# Patient Record
Sex: MALE | Race: WHITE | NOT HISPANIC OR LATINO | URBAN - METROPOLITAN AREA
[De-identification: names, ages, dates, MRNs, and addresses within clinical notes are randomized per-mention and may not be internally consistent; named-entity substitution may affect disease eponyms.]

---

## 2017-04-05 ENCOUNTER — ALLSCRIPTS OFFICE VISIT (OUTPATIENT)
Dept: OTHER | Facility: OTHER | Age: 52
End: 2017-04-05

## 2017-04-07 LAB — AMBIG ABBREV CMP14 DEFAULT (HISTORICAL): NORMAL

## 2017-04-08 LAB
BASOPHILS # BLD AUTO: 0.1 X10E3/UL (ref 0–0.2)
BASOPHILS # BLD AUTO: 1 %
CHOLEST SERPL-MCNC: 175 MG/DL (ref 100–199)
DEPRECATED RDW RBC AUTO: 14.1 % (ref 12.3–15.4)
EOSINOPHIL # BLD AUTO: 0.5 X10E3/UL (ref 0–0.4)
EOSINOPHIL # BLD AUTO: 6 %
HCT VFR BLD AUTO: 41.6 % (ref 37.5–51)
HDLC SERPL-MCNC: 46 MG/DL
HGB BLD-MCNC: 13.6 G/DL (ref 12.6–17.7)
IMM.GRANULOCYTES (CD4/8) (HISTORICAL): 0 %
IMM.GRANULOCYTES (CD4/8) (HISTORICAL): 0 X10E3/UL (ref 0–0.1)
LDLC SERPL CALC-MCNC: 108 MG/DL (ref 0–99)
LYMPHOCYTES # BLD AUTO: 3.2 X10E3/UL (ref 0.7–3.1)
LYMPHOCYTES # BLD AUTO: 37 %
MCH RBC QN AUTO: 30.3 PG (ref 26.6–33)
MCHC RBC AUTO-ENTMCNC: 32.7 G/DL (ref 31.5–35.7)
MCV RBC AUTO: 93 FL (ref 79–97)
MONOCYTES # BLD AUTO: 0.8 X10E3/UL (ref 0.1–0.9)
MONOCYTES (HISTORICAL): 9 %
NEUTROPHILS # BLD AUTO: 4 X10E3/UL (ref 1.4–7)
NEUTROPHILS # BLD AUTO: 47 %
PLATELET # BLD AUTO: 227 X10E3/UL (ref 150–379)
PROSTATE SPECIFIC ANTIGEN (HISTORICAL): 0.3 NG/ML (ref 0–4)
RBC (HISTORICAL): 4.49 X10E6/UL (ref 4.14–5.8)
TRIGL SERPL-MCNC: 105 MG/DL (ref 0–149)
TSH SERPL DL<=0.05 MIU/L-ACNC: 1.59 UIU/ML (ref 0.45–4.5)
WBC # BLD AUTO: 8.5 X10E3/UL (ref 3.4–10.8)

## 2017-04-11 ENCOUNTER — GENERIC CONVERSION - ENCOUNTER (OUTPATIENT)
Dept: OTHER | Facility: OTHER | Age: 52
End: 2017-04-11

## 2017-04-12 LAB
TESTOSTERONE FREE (HISTORICAL): 4.7 PG/ML (ref 7.2–24)
TESTOSTERONE TOTAL (HISTORICAL): 887.4 NG/DL (ref 348–1197)

## 2017-04-13 ENCOUNTER — GENERIC CONVERSION - ENCOUNTER (OUTPATIENT)
Dept: OTHER | Facility: OTHER | Age: 52
End: 2017-04-13

## 2017-05-11 ENCOUNTER — GENERIC CONVERSION - ENCOUNTER (OUTPATIENT)
Dept: OTHER | Facility: OTHER | Age: 52
End: 2017-05-11

## 2017-06-26 ENCOUNTER — GENERIC CONVERSION - ENCOUNTER (OUTPATIENT)
Dept: OTHER | Facility: OTHER | Age: 52
End: 2017-06-26

## 2017-06-27 ENCOUNTER — ALLSCRIPTS OFFICE VISIT (OUTPATIENT)
Dept: OTHER | Facility: OTHER | Age: 52
End: 2017-06-27

## 2017-12-08 ENCOUNTER — ALLSCRIPTS OFFICE VISIT (OUTPATIENT)
Dept: OTHER | Facility: OTHER | Age: 52
End: 2017-12-08

## 2017-12-09 NOTE — PROGRESS NOTES
Assessment    1  Encounter for preventive health examination (V70 0) (Z00 00)   2  Attention-deficit/hyperactivity disorder (314 01) (F90 9)    Plan  Attention-deficit/hyperactivity disorder    · Amphetamine-Dextroamphet ER 30 MG Oral Capsule Extended Release 24Hour; TAKE 2 CAPSULES DAILY; Do Not Fill Before: [id number]  Gastritis    · Omeprazole 40 MG Oral Capsule Delayed Release; TAKE 1 CAPSULE Daily    Chief Complaint  MED CHECKbone spur--left shoulder      History of Present Illness  ADD stable      Review of Systems   Cardiovascular: No complaints of slow heart rate, no fast heart rate, no chest pain, no palpitations, no leg claudication, no lower extremity  Respiratory: No complaints of shortness of breath, no wheezing, no cough, no SOB on exertion, no orthopnea or PND  Active Problems  1  Allergic contact dermatitis (692 9) (L23 9)   2  Attention-deficit/hyperactivity disorder (314 01) (F90 9)   3  Bakers cyst (727 51) (M71 20)   4  Chronic lumbar pain (724 2,338 29) (M54 5,G89 29)   5  Encounter for prostate cancer screening (V76 44) (Z12 5)   6  Encounter for screening colonoscopy (V76 51) (Z12 11)   7  Family history of high cholesterol (V18 19) (Z83 42)   8  Fatigue (780 79) (R53 83)   9  Gastritis (535 50) (K29 70)   10  Lateral epicondylitis of left elbow (726 32) (M77 12)   11  Localized enlarged lymph nodes (785 6) (R59 0)   12  Pre-ulcerative corn or callous (700) (L84)   13  Shoulder bursitis, left (726 10) (M75 52)    Past Medical History  1  History of dyschromia (V13 3) (Z87 2)   2  History of spinal stenosis (V13 59) (Z87 39)   3  History of Idiopathic peripheral neuropathy (356 9) (G60 9)   4  History of Myalgia and myositis (729 1)    Family History  Mother    1  Denied: Family history of substance abuse   2  Denied: FH: mental illness  Father    3  Family history of diabetes mellitus (V18 0) (Z83 3)   4  Denied: Family history of substance abuse   5   Denied: FH: mental illness    Social History     · Current every day smoker (305 1) (F17 200)   · Smokes < 1 pack of cigarettes per day (305 1) (F17 210)    Current Meds   1  Amphetamine-Dextroamphet ER 30 MG Oral Capsule Extended Release 24 Hour; TAKE 2 CAPSULES DAILY; Therapy: 01FKS0745 to (Evaluate:25Nov2017); Last Rx:26Oct2017; Status: ACTIVE - Retrospective By Protocol Authorization Ordered   2  Duexis 800-26 6 MG Oral Tablet; take one table tid; Therapy: 63RHM2117 to (Evaluate:29Mar2017)  Requested for: 95ZKJ8167; Last Rx:29Nov2016; Status: ACTIVE - Retrospective By Protocol Authorization Ordered   3  OxyCODONE HCl - 15 MG Oral Tablet; 1 q 8hrs  prn; Therapy: 37DOW7807 to (Last Rx:05Jan2017); Status: ACTIVE - Retrospective By Protocol Authorization Ordered   4  PredniSONE 20 MG Oral Tablet; 1 bid x 5 days , 1 qd x 5 days; Therapy: 56HBZ5828 to (Evaluate:08Jan2017)  Requested for: 29DZK5961; Last Rx:29Nov2016 Ordered   5  Triamcinolone Acetonide 0 5 % External Cream; APPLY 2-3 TIMES DAILY TO AFFECTED AREA(S); Therapy: 65JXG8769 to (Last Rx:27Jun2017)  Requested for: 01TRL2301 Ordered    Allergies  1  No Known Drug Allergies    Vitals  Vital Signs    Recorded: 11RCK8282 09:16AM   Heart Rate 90   Systolic 274   Diastolic 76   Height 6 ft 2 in   Weight 180 lb    BMI Calculated 23 11   BSA Calculated 2 08   O2 Saturation 98       Physical Exam   Constitutional  General appearance: No acute distress, well appearing and well nourished  Pulmonary  Auscultation of lungs: Clear to auscultation, equal breath sounds bilaterally, no wheezes, no rales, no rhonci  Results/Data  PHQ-2 Adult Depression Screening 02QRM2685 09:19AM User, Ahs     Test Name Result Flag Reference   PHQ-2 Adult Depression Score 0       Over the last two weeks, how often have you been bothered by any of the following problems?  Little interest or pleasure in doing things: Not at all - 0 Feeling down, depressed, or hopeless: Not at all - 0   PHQ-2 Adult Depression Screening Negative           Health Management  Encounter for screening colonoscopy   COLONOSCOPY; every 10 years; Last 77ANZ3950; Next Due: 72ZHK2437;  Active    Signatures   Electronically signed by : Maryanne Graves MD; Dec  8 2017  9:49AM EST                       (Author)

## 2018-01-09 NOTE — RESULT NOTES
Verified Results  [facility]) CBC+Platelet+Hem Review 48QYD9907 12:22PM Juaquin Galvin     Test Name Result Flag Reference   WBC 7 0 x10E3/uL  3 4-10 8   RBC 4 35 x10E6/uL  4 14-5 80   Hemoglobin 13 4 g/dL  12 6-17 7   Hematocrit 39 9 %  37 5-51 0   MCV 92 fL  79-97   MCH 30 8 pg  26 6-33 0   MCHC 33 6 g/dL  31 5-35 7   RDW 13 5 %  12 3-15 4   Platelets 915 J08O1/LL  150-379   Neutrophils 51 %     Lymphs 47 %     Monocytes 1 %     Eos 0 %     Basos 1 %     Neutrophils Absolute 3 6 X10E3/uL  1 4-7 0   Lymphs (Absolute) 3 3 X10E3/uL H 0 7-3 1   Monocytes(Absolute) 0 1 X10E3/uL  0 1-0 9   Eos (Absolute Value) 0 0 X10E3/uL  0 0-0 4   Baso(Absolute) 0 1 X10E3/uL  0 0-0 2   RBC Comment RBC's appear normal   Normal   Platelet Comment Adequate  Adequate   WBC 7 0 x10E3/uL  3 4-10 8   RBC 4 35 x10E6/uL  4 14-5 80   Hemoglobin 13 4 g/dL  12 6-17 7   Hematocrit 39 9 %  37 5-51 0   MCV 92 fL  79-97   MCH 30 8 pg  26 6-33 0   MCHC 33 6 g/dL  31 5-35 7   RDW 13 5 %  12 3-15 4   Platelets 309 T51K9/HX  150-379   Neutrophils 51 %     Lymphs 47 %     Monocytes 1 %     Eos 0 %     Basos 1 %     Neutrophils Absolute 3 6 X10E3/uL  1 4-7 0   Lymphs (Absolute) 3 3 X10E3/uL H 0 7-3 1   Monocytes(Absolute) 0 1 X10E3/uL  0 1-0 9   Eos (Absolute Value) 0 0 X10E3/uL  0 0-0 4   Baso(Absolute) 0 1 X10E3/uL  0 0-0 2   RBC Comment RBC's appear normal   Normal   Platelet Comment Adequate  Adequate

## 2018-01-09 NOTE — RESULT NOTES
Verified Results  (1) TSH 07Apr2017 06:44AM Coral Arline     Test Name Result Flag Reference   TSH 1 590 uIU/mL  0 450-4 500                 [facility]) Godwin Daniels XEY11 Default 07Apr2017 06:44AM Coral Arline     Test Name Result Flag Reference   Godwin Daniels CMP14 Default Comment     A hand-written panel/profile was received from your office  In  accordance with the [facility] Ambiguous Test Code Policy dated July 9637, we have completed your order by using the closest currently  or formerly recognized AMA panel  We have assigned Comprehensive  Metabolic Panel (14), Test Code #087201 to this request   If this  is not the testing you wished to receive on this specimen, please  contact the [address] [place] [address] Client Inquiry/Technical Services Department  to clarify the test order  We appreciate your business  (1) LIPID PANEL FASTING W DIRECT LDL REFLEX 07Apr2017 06:44AM Janet Trivedi     Test Name Result Flag Reference   Cholesterol, Total 175 mg/dL  100-199   Triglycerides 105 mg/dL  0-149   HDL Cholesterol 46 mg/dL  >39   LDL Cholesterol Calc 108 mg/dL H 0-99   Cholesterol, Total 175 mg/dL  100-199   Triglycerides 105 mg/dL  0-149   HDL Cholesterol 46 mg/dL  >39   LDL Cholesterol Calc 108 mg/dL H 0-99           (1) PSA (SCREEN) (Dx V76 44 Screen for Prostate Cancer) 07Apr2017 06:44AM Coral Farooq     Test Name Result Flag Reference   Prostate Specific Ag, Serum 0 3 ng/mL  0 0-4 0   Roche ECLIA methodology  According to the American Urological Association, Serum PSA should  decrease and remain at undetectable levels after radical  prostatectomy  The AUA defines biochemical recurrence as an initial  PSA value 0 2 ng/mL or greater followed by a subsequent confirmatory  PSA value 0 2 ng/mL or greater  Values obtained with different assay methods or kits cannot be used  interchangeably  Results cannot be interpreted as absolute evidence  of the presence or absence of malignant disease  (1) CBC/PLT/DIFF 07Apr2017 06:44AM Moi Nolen     Test Name Result Flag Reference   WBC 8 5 x10E3/uL  3 4-10 8   RBC 4 49 x10E6/uL  4 14-5 80   Hemoglobin 13 6 g/dL  12 6-17 7   Hematocrit 41 6 %  37 5-51 0   MCV 93 fL  79-97   MCH 30 3 pg  26 6-33 0   MCHC 32 7 g/dL  31 5-35 7   RDW 14 1 %  12 3-15 4   Platelets 514 J90Y1/QQ  150-379   Neutrophils 47 %     Lymphs 37 %     Monocytes 9 %     Eos 6 %     Basos 1 %     Neutrophils (Absolute) 4 0 x10E3/uL  1 4-7 0   Lymphs (Absolute) 3 2 x10E3/uL H 0 7-3 1   Monocytes(Absolute) 0 8 x10E3/uL  0 1-0 9   Eos (Absolute) 0 5 x10E3/uL H 0 0-0 4   Baso (Absolute) 0 1 x10E3/uL  0 0-0 2   Immature Granulocytes 0 %     Immature Grans (Abs) 0 0 x10E3/uL  0 0-0 1   WBC 8 5 x10E3/uL  3 4-10 8   RBC 4 49 x10E6/uL  4 14-5 80   Hemoglobin 13 6 g/dL  12 6-17 7   Hematocrit 41 6 %  37 5-51 0   MCV 93 fL  79-97   MCH 30 3 pg  26 6-33 0   MCHC 32 7 g/dL  31 5-35 7   RDW 14 1 %  12 3-15 4   Platelets 711 O27Z7/YD  150-379   Neutrophils 47 %     Lymphs 37 %     Monocytes 9 %     Eos 6 %     Basos 1 %     Neutrophils (Absolute) 4 0 x10E3/uL  1 4-7 0   Lymphs (Absolute) 3 2 x10E3/uL H 0 7-3 1   Monocytes(Absolute) 0 8 x10E3/uL  0 1-0 9   Eos (Absolute) 0 5 x10E3/uL H 0 0-0 4   Baso (Absolute) 0 1 x10E3/uL  0 0-0 2   Immature Granulocytes 0 %     Immature Grans (Abs) 0 0 x10E3/uL  0 0-0 1

## 2018-01-10 NOTE — RESULT NOTES
Message   Recorded as Task   Date: 04/11/2017 08:47 AM, Created By: Meg Gomes   Task Name: Call Patient with results   Assigned To:  Meg Gomes   Regarding Patient: Pa Sánchez, Status: Active   CommentDarlene Alias - 11 Apr 2017 8:47 AM     Patient Phone: [phone]    all NL--mm/labs 12mos   Melissa Rivera - 11 Apr 2017 9:45 AM     TASK EDITED  PT AWARE        Signatures   Electronically signed by : Genaro Moore, ; Apr 11 2017  9:46AM EST                       (Author)

## 2018-01-13 VITALS
OXYGEN SATURATION: 98 % | HEIGHT: 74 IN | BODY MASS INDEX: 23.87 KG/M2 | SYSTOLIC BLOOD PRESSURE: 126 MMHG | HEART RATE: 88 BPM | DIASTOLIC BLOOD PRESSURE: 88 MMHG | WEIGHT: 186 LBS

## 2018-01-13 NOTE — RESULT NOTES
Message   Recorded as Task   Date: 11/30/2016 06:36 AM, Created By: Tez Frye   Task Name: Call Patient with results   Assigned To:  Tez Frye   Regarding Patient: Jo Felton, Status: Active   CommentTommas Hunter - 30 Nov 2016 6:36 AM     Patient Phone: [phone]    ok---mm/labs 12mos   Bindu Days - 30 Nov 2016 8:32 AM     TASK EDITED  pt aware        Signatures   Electronically signed by : Lester Tamez, ; Nov 30 2016  8:33AM EST                       (Author)

## 2018-01-16 NOTE — RESULT NOTES
Verified Results  [facility]) CMP14+eGFR 96MJY8939 12:22PM Kiara Gamez     Test Name Result Flag Reference   Glucose, Serum 94 mg/dL  65-99   BUN 11 mg/dL  6-24   Creatinine, Serum 0 68 mg/dL L 0 76-1 27   eGFR If NonAfricn Am 112 mL/min/1 73  >59   eGFR If Africn Am 130 mL/min/1 73  >59   BUN/Creatinine Ratio 16  9-20   Sodium, Serum 139 mmol/L  136-144   **Effective December 12, 2016 the reference interval**                   for Sodium, Serum will be changing to:                                                             134 - 144   Potassium, Serum 4 4 mmol/L  3 5-5 2   Chloride, Serum 101 mmol/L     **Effective December 12, 2016 the reference interval**                   for Chloride, Serum will be changing to:                                                              96 - 106   Carbon Dioxide, Total 24 mmol/L  18-29   Calcium, Serum 8 9 mg/dL  8 7-10 2   Protein, Total, Serum 6 4 g/dL  6 0-8 5   Albumin, Serum 4 2 g/dL  3 5-5 5   Globulin, Total 2 2 g/dL  1 5-4 5   A/G Ratio 1 9  1 1-2 5   Bilirubin, Total <0 2 mg/dL  0 0-1 2   Alkaline Phosphatase, S 115 IU/L     AST (SGOT) 21 IU/L  0-40   ALT (SGPT) 16 IU/L  0-44   Glucose, Serum 94 mg/dL  65-99   BUN 11 mg/dL  6-24   Creatinine, Serum 0 68 mg/dL L 0 76-1 27   eGFR If NonAfricn Am 112 mL/min/1 73  >59   eGFR If Africn Am 130 mL/min/1 73  >59   BUN/Creatinine Ratio 16  9-20   Sodium, Serum 139 mmol/L  136-144   **Effective December 12, 2016 the reference interval**                   for Sodium, Serum will be changing to:                                                             134 - 144   Potassium, Serum 4 4 mmol/L  3 5-5 2   Chloride, Serum 101 mmol/L     **Effective December 12, 2016 the reference interval**                   for Chloride, Serum will be changing to:                                                              96 - 106   Carbon Dioxide, Total 24 mmol/L  18-29   Calcium, Serum 8 9 mg/dL  8 7-10 2   Protein, Total, Serum 6 4 g/dL  6 0-8 5   Albumin, Serum 4 2 g/dL  3 5-5 5   Globulin, Total 2 2 g/dL  1 5-4 5   A/G Ratio 1 9  1 1-2 5   Bilirubin, Total <0 2 mg/dL  0 0-1 2   Alkaline Phosphatase, S 115 IU/L     AST (SGOT) 21 IU/L  0-40   ALT (SGPT) 16 IU/L  0-44

## 2018-01-18 NOTE — RESULT NOTES
Message   Recorded as Task   Date: 04/13/2017 09:35 AM, Created By: Merna Gaona   Task Name: Call Patient with results   Assigned To:  Merna Gaona   Regarding Patient: Kirk Hudson, Status: Active   CommentRoise Multnomah - 13 Apr 2017 9:35 AM     Patient Phone: [phone]    testos ess NL---free T is low due to chronic opioids   Wenceslao Salas - 13 Apr 2017 2:27 PM     TASK EDITED  Detailed msg left C#        Signatures   Electronically signed by : Jadyn Farrell, ; Apr 13 2017  2:27PM EST                       (Author)

## 2018-01-18 NOTE — RESULT NOTES
Verified Results  (1) TESTOSTERONE, FREE (DIRECT) AND TOTAL 07Apr2017 06:44AM Mark Garrett     Test Name Result Flag Reference   Testosterone, Total, LC/ 4 ng/dL  348 0-1197 0   Adult male reference interval is based on a population of lean males  up to 36years old  This test was developed and its performance characteristics  determined by [facility]  It has not been cleared or approved  by the Food and Drug Administration  Free Testosterone(Direct) 4 7 pg/mL L 7 2-24 0   Testosterone, Total, LC/ 4 ng/dL  348 0-1197 0   Adult male reference interval is based on a population of lean males  up to 36years old  This test was developed and its performance characteristics  determined by [facility]  It has not been cleared or approved  by the Food and Drug Administration     Free Testosterone(Direct) 4 7 pg/mL L 7 2-24 0

## 2018-01-23 VITALS
OXYGEN SATURATION: 98 % | WEIGHT: 180 LBS | HEART RATE: 90 BPM | HEIGHT: 74 IN | DIASTOLIC BLOOD PRESSURE: 76 MMHG | BODY MASS INDEX: 23.1 KG/M2 | SYSTOLIC BLOOD PRESSURE: 132 MMHG

## 2018-02-08 ENCOUNTER — TELEPHONE (OUTPATIENT)
Dept: FAMILY MEDICINE CLINIC | Facility: CLINIC | Age: 53
End: 2018-02-08

## 2018-03-09 DIAGNOSIS — F98.8 ATTENTION DEFICIT DISORDER, UNSPECIFIED HYPERACTIVITY PRESENCE: Primary | ICD-10-CM

## 2018-03-09 RX ORDER — OXYCODONE HYDROCHLORIDE 15 MG/1
TABLET ORAL
COMMUNITY
Start: 2015-12-21 | End: 2018-06-06 | Stop reason: ALTCHOICE

## 2018-03-09 RX ORDER — DEXTROAMPHETAMINE SACCHARATE, AMPHETAMINE ASPARTATE MONOHYDRATE, DEXTROAMPHETAMINE SULFATE AND AMPHETAMINE SULFATE 7.5; 7.5; 7.5; 7.5 MG/1; MG/1; MG/1; MG/1
2 CAPSULE, EXTENDED RELEASE ORAL DAILY
COMMUNITY
Start: 2016-03-28 | End: 2018-03-09 | Stop reason: SDUPTHER

## 2018-03-09 RX ORDER — TRIAMCINOLONE ACETONIDE 5 MG/G
CREAM TOPICAL 3 TIMES DAILY
COMMUNITY
Start: 2016-07-08 | End: 2019-05-16 | Stop reason: SDUPTHER

## 2018-03-09 RX ORDER — IBUPROFEN AND FAMOTIDINE 26.6; 8 MG/1; MG/1
TABLET, FILM COATED ORAL
COMMUNITY
Start: 2016-11-29 | End: 2018-06-06 | Stop reason: ALTCHOICE

## 2018-03-09 RX ORDER — OMEPRAZOLE 40 MG/1
1 CAPSULE, DELAYED RELEASE ORAL DAILY
COMMUNITY
Start: 2017-12-08 | End: 2020-03-10 | Stop reason: ALTCHOICE

## 2018-03-09 RX ORDER — PREDNISONE 20 MG/1
TABLET ORAL
COMMUNITY
Start: 2016-11-29 | End: 2018-06-06 | Stop reason: ALTCHOICE

## 2018-03-11 RX ORDER — DEXTROAMPHETAMINE SACCHARATE, AMPHETAMINE ASPARTATE MONOHYDRATE, DEXTROAMPHETAMINE SULFATE AND AMPHETAMINE SULFATE 7.5; 7.5; 7.5; 7.5 MG/1; MG/1; MG/1; MG/1
60 CAPSULE, EXTENDED RELEASE ORAL DAILY
Qty: 60 CAPSULE | Refills: 0 | Status: SHIPPED | OUTPATIENT
Start: 2018-03-11 | End: 2018-04-12 | Stop reason: SDUPTHER

## 2018-03-23 ENCOUNTER — TELEPHONE (OUTPATIENT)
Dept: FAMILY MEDICINE CLINIC | Facility: CLINIC | Age: 53
End: 2018-03-23

## 2018-04-12 ENCOUNTER — OFFICE VISIT (OUTPATIENT)
Dept: FAMILY MEDICINE CLINIC | Facility: CLINIC | Age: 53
End: 2018-04-12
Payer: COMMERCIAL

## 2018-04-12 VITALS
SYSTOLIC BLOOD PRESSURE: 132 MMHG | HEIGHT: 74 IN | OXYGEN SATURATION: 98 % | WEIGHT: 199 LBS | HEART RATE: 73 BPM | BODY MASS INDEX: 25.54 KG/M2 | DIASTOLIC BLOOD PRESSURE: 72 MMHG

## 2018-04-12 DIAGNOSIS — J40 BRONCHITIS: Primary | ICD-10-CM

## 2018-04-12 DIAGNOSIS — F98.8 ATTENTION DEFICIT DISORDER, UNSPECIFIED HYPERACTIVITY PRESENCE: ICD-10-CM

## 2018-04-12 PROCEDURE — 3008F BODY MASS INDEX DOCD: CPT | Performed by: FAMILY MEDICINE

## 2018-04-12 PROCEDURE — 99214 OFFICE O/P EST MOD 30 MIN: CPT | Performed by: FAMILY MEDICINE

## 2018-04-12 RX ORDER — AZITHROMYCIN 250 MG/1
TABLET, FILM COATED ORAL
Qty: 6 TABLET | Refills: 0 | Status: SHIPPED | OUTPATIENT
Start: 2018-04-12 | End: 2018-04-17

## 2018-04-12 RX ORDER — DEXTROAMPHETAMINE SACCHARATE, AMPHETAMINE ASPARTATE MONOHYDRATE, DEXTROAMPHETAMINE SULFATE AND AMPHETAMINE SULFATE 7.5; 7.5; 7.5; 7.5 MG/1; MG/1; MG/1; MG/1
60 CAPSULE, EXTENDED RELEASE ORAL DAILY
Qty: 60 CAPSULE | Refills: 0 | Status: SHIPPED | OUTPATIENT
Start: 2018-04-12 | End: 2018-05-10 | Stop reason: SDUPTHER

## 2018-04-12 RX ORDER — OXYCODONE HYDROCHLORIDE 30 MG/1
TABLET ORAL EVERY 6 HOURS PRN
COMMUNITY
Start: 2018-03-24 | End: 2018-10-25

## 2018-04-12 NOTE — PROGRESS NOTES
[id number]         NAME: Kiah Tamez is a 47 y o  male  : 1965    MRN: [id number]  DATE: 2018  TIME: 7:54 AM    Assessment and Plan   Bronchitis [J40]  1  Bronchitis  azithromycin (ZITHROMAX) 250 mg tablet   2  Attention deficit disorder, unspecified hyperactivity presence  amphetamine-dextroamphetamine (ADDERALL XR) 30 MG 24 hr capsule         Patient Instructions     Patient Instructions   Finish AB          Chief Complaint     Chief Complaint   Patient presents with    Med check    Cold Like Symptoms     fatigue, chest congestion, body aches         History of Present Illness       C/o prod cough        Review of Systems   Review of Systems   HENT: Positive for sinus pain and sinus pressure  Respiratory: Positive for cough and shortness of breath            Current Medications       Current Outpatient Prescriptions:     amphetamine-dextroamphetamine (ADDERALL XR) 30 MG 24 hr capsule, Take 2 capsules (60 mg total) by mouth daily Max Daily Amount: 60 mg, Disp: 60 capsule, Rfl: 0    omeprazole (PriLOSEC) 40 MG capsule, Take 1 capsule by mouth daily, Disp: , Rfl:     oxyCODONE (ROXICODONE) 30 MG immediate release tablet, , Disp: , Rfl:     triamcinolone (KENALOG) 0 5 % cream, Apply topically 3 (three) times a day, Disp: , Rfl:     azithromycin (ZITHROMAX) 250 mg tablet, 2 tabs Day 1, then 1 tab daily X 4 days, Disp: 6 tablet, Rfl: 0    Ibuprofen-Famotidine (DUEXIS) 800-26 6 MG TABS, Take by mouth, Disp: , Rfl:     oxyCODONE (ROXICODONE) 15 mg immediate release tablet, Take by mouth, Disp: , Rfl:     predniSONE 20 mg tablet, Take by mouth, Disp: , Rfl:     Current Allergies     Allergies as of 2018    (No Known Allergies)            The following portions of the patient's history were reviewed and updated as appropriate: allergies, current medications, past family history, past medical history, past social history, past surgical history and problem list      Past Medical History:   Diagnosis Date    Dyschromia     Idiopathic peripheral neuropathy     Spinal stenosis        No past surgical history on file  Family History   Problem Relation Age of Onset    Diabetes Father          Medications have been verified  Objective   /72   Pulse 73   Ht 6' 2" (1 88 m)   Wt 90 3 kg (199 lb)   SpO2 98%   BMI 25 55 kg/m²        Physical Exam     Physical Exam   Constitutional: He appears well-developed and well-nourished  No distress  HENT:   Right Ear: Tympanic membrane, external ear and ear canal normal  Tympanic membrane is not injected  Left Ear: Tympanic membrane, external ear and ear canal normal  Tympanic membrane is not injected  Nose: Nose normal    Mouth/Throat: Uvula is midline, oropharynx is clear and moist and mucous membranes are normal    Eyes: Conjunctivae are normal  Pupils are equal, round, and reactive to light  Neck: Normal range of motion  Neck supple  No thyromegaly present  Cardiovascular: Normal rate, regular rhythm and normal heart sounds  No murmur heard  Pulmonary/Chest: Effort normal and breath sounds normal  No respiratory distress  He has no wheezes  bilat rhonchi   Lymphadenopathy:     He has no cervical adenopathy  Skin: He is not diaphoretic

## 2018-05-10 DIAGNOSIS — F98.8 ATTENTION DEFICIT DISORDER, UNSPECIFIED HYPERACTIVITY PRESENCE: ICD-10-CM

## 2018-05-10 RX ORDER — DEXTROAMPHETAMINE SACCHARATE, AMPHETAMINE ASPARTATE MONOHYDRATE, DEXTROAMPHETAMINE SULFATE AND AMPHETAMINE SULFATE 7.5; 7.5; 7.5; 7.5 MG/1; MG/1; MG/1; MG/1
60 CAPSULE, EXTENDED RELEASE ORAL DAILY
Qty: 60 CAPSULE | Refills: 0 | Status: SHIPPED | OUTPATIENT
Start: 2018-05-11 | End: 2018-06-11 | Stop reason: SDUPTHER

## 2018-06-06 ENCOUNTER — APPOINTMENT (EMERGENCY)
Dept: RADIOLOGY | Facility: HOSPITAL | Age: 53
End: 2018-06-06
Payer: COMMERCIAL

## 2018-06-06 ENCOUNTER — HOSPITAL ENCOUNTER (OUTPATIENT)
Facility: HOSPITAL | Age: 53
Setting detail: OBSERVATION
Discharge: HOME | End: 2018-06-07
Attending: EMERGENCY MEDICINE | Admitting: INTERNAL MEDICINE
Payer: COMMERCIAL

## 2018-06-06 DIAGNOSIS — S52.90XA RADIUS FRACTURE: ICD-10-CM

## 2018-06-06 DIAGNOSIS — T14.8XXA CRUSH INJURY: Primary | ICD-10-CM

## 2018-06-06 PROCEDURE — 90715 TDAP VACCINE 7 YRS/> IM: CPT | Performed by: EMERGENCY MEDICINE

## 2018-06-06 PROCEDURE — 73110 X-RAY EXAM OF WRIST: CPT

## 2018-06-06 PROCEDURE — 73080 X-RAY EXAM OF ELBOW: CPT

## 2018-06-06 PROCEDURE — 73090 X-RAY EXAM OF FOREARM: CPT

## 2018-06-06 PROCEDURE — 96372 THER/PROPH/DIAG INJ SC/IM: CPT

## 2018-06-06 RX ORDER — MORPHINE SULFATE 4 MG/ML
4 INJECTION, SOLUTION INTRAMUSCULAR; INTRAVENOUS ONCE
Status: COMPLETED | OUTPATIENT
Start: 2018-06-06 | End: 2018-06-06

## 2018-06-06 RX ADMIN — MORPHINE SULFATE 4 MG: 4 INJECTION, SOLUTION INTRAMUSCULAR; INTRAVENOUS at 22:15

## 2018-06-06 RX ADMIN — TETANUS TOXOID, REDUCED DIPHTHERIA TOXOID AND ACELLULAR PERTUSSIS VACCINE, ADSORBED 0.5 ML: 5; 2.5; 8; 8; 2.5 SUSPENSION INTRAMUSCULAR at 22:16

## 2018-06-07 VITALS
OXYGEN SATURATION: 98 % | HEIGHT: 74 IN | BODY MASS INDEX: 24.62 KG/M2 | DIASTOLIC BLOOD PRESSURE: 82 MMHG | SYSTOLIC BLOOD PRESSURE: 128 MMHG | HEART RATE: 67 BPM | RESPIRATION RATE: 18 BRPM | WEIGHT: 191.8 LBS | TEMPERATURE: 97.9 F

## 2018-06-07 PROBLEM — S52.90XA RADIUS FRACTURE: Status: ACTIVE | Noted: 2018-06-07

## 2018-06-07 PROBLEM — T14.8XXA CRUSH INJURY: Status: ACTIVE | Noted: 2018-06-07

## 2018-06-07 LAB
ANION GAP SERPL CALCULATED.3IONS-SCNC: 8 MMOL/L (ref 4–13)
BASOPHILS # BLD AUTO: 0.09 THOUSANDS/ΜL (ref 0–0.1)
BASOPHILS NFR BLD AUTO: 1 % (ref 0–1)
BUN SERPL-MCNC: 21 MG/DL (ref 5–25)
CALCIUM SERPL-MCNC: 9.1 MG/DL (ref 8.3–10.1)
CHLORIDE SERPL-SCNC: 103 MMOL/L (ref 100–108)
CO2 SERPL-SCNC: 31 MMOL/L (ref 21–32)
CREAT SERPL-MCNC: 0.99 MG/DL (ref 0.6–1.3)
EOSINOPHIL # BLD AUTO: 0.47 THOUSAND/ΜL (ref 0–0.61)
EOSINOPHIL NFR BLD AUTO: 4 % (ref 0–6)
ERYTHROCYTE [DISTWIDTH] IN BLOOD BY AUTOMATED COUNT: 13 % (ref 11.6–15.1)
GFR SERPL CREATININE-BSD FRML MDRD: 88 ML/MIN/1.73SQ M
GLUCOSE SERPL-MCNC: 92 MG/DL (ref 65–140)
HCT VFR BLD AUTO: 39 % (ref 36.5–49.3)
HGB BLD-MCNC: 12.8 G/DL (ref 12–17)
IMM GRANULOCYTES # BLD AUTO: 0.04 THOUSAND/UL (ref 0–0.2)
IMM GRANULOCYTES NFR BLD AUTO: 0 % (ref 0–2)
LYMPHOCYTES # BLD AUTO: 3.09 THOUSANDS/ΜL (ref 0.6–4.47)
LYMPHOCYTES NFR BLD AUTO: 27 % (ref 14–44)
MCH RBC QN AUTO: 31.1 PG (ref 26.8–34.3)
MCHC RBC AUTO-ENTMCNC: 32.8 G/DL (ref 31.4–37.4)
MCV RBC AUTO: 95 FL (ref 82–98)
MONOCYTES # BLD AUTO: 0.91 THOUSAND/ΜL (ref 0.17–1.22)
MONOCYTES NFR BLD AUTO: 8 % (ref 4–12)
NEUTROPHILS # BLD AUTO: 6.94 THOUSANDS/ΜL (ref 1.85–7.62)
NEUTS SEG NFR BLD AUTO: 60 % (ref 43–75)
NRBC BLD AUTO-RTO: 0 /100 WBCS
PLATELET # BLD AUTO: 203 THOUSANDS/UL (ref 149–390)
PMV BLD AUTO: 11.6 FL (ref 8.9–12.7)
POTASSIUM SERPL-SCNC: 3.8 MMOL/L (ref 3.5–5.3)
RBC # BLD AUTO: 4.12 MILLION/UL (ref 3.88–5.62)
SODIUM SERPL-SCNC: 142 MMOL/L (ref 136–145)
WBC # BLD AUTO: 11.54 THOUSAND/UL (ref 4.31–10.16)

## 2018-06-07 PROCEDURE — 36415 COLL VENOUS BLD VENIPUNCTURE: CPT | Performed by: EMERGENCY MEDICINE

## 2018-06-07 PROCEDURE — 99285 EMERGENCY DEPT VISIT HI MDM: CPT

## 2018-06-07 PROCEDURE — 99244 OFF/OP CNSLTJ NEW/EST MOD 40: CPT | Performed by: ORTHOPAEDIC SURGERY

## 2018-06-07 PROCEDURE — 80048 BASIC METABOLIC PNL TOTAL CA: CPT | Performed by: EMERGENCY MEDICINE

## 2018-06-07 PROCEDURE — 25600 CLTX DST RDL FX/EPHYS SEP WO: CPT | Performed by: ORTHOPAEDIC SURGERY

## 2018-06-07 PROCEDURE — 90471 IMMUNIZATION ADMIN: CPT

## 2018-06-07 PROCEDURE — 85025 COMPLETE CBC W/AUTO DIFF WBC: CPT | Performed by: EMERGENCY MEDICINE

## 2018-06-07 PROCEDURE — 99220 PR INITIAL OBSERVATION CARE/DAY 70 MINUTES: CPT | Performed by: INTERNAL MEDICINE

## 2018-06-07 RX ORDER — PANTOPRAZOLE SODIUM 40 MG/1
40 TABLET, DELAYED RELEASE ORAL
Status: DISCONTINUED | OUTPATIENT
Start: 2018-06-07 | End: 2018-06-07 | Stop reason: HOSPADM

## 2018-06-07 RX ORDER — DEXTROAMPHETAMINE SACCHARATE, AMPHETAMINE ASPARTATE, DEXTROAMPHETAMINE SULFATE AND AMPHETAMINE SULFATE 5; 5; 5; 5 MG/1; MG/1; MG/1; MG/1
60 TABLET ORAL DAILY
Status: DISCONTINUED | OUTPATIENT
Start: 2018-06-07 | End: 2018-06-07 | Stop reason: HOSPADM

## 2018-06-07 RX ORDER — ONDANSETRON 2 MG/ML
4 INJECTION INTRAMUSCULAR; INTRAVENOUS EVERY 6 HOURS PRN
Status: DISCONTINUED | OUTPATIENT
Start: 2018-06-07 | End: 2018-06-07 | Stop reason: HOSPADM

## 2018-06-07 RX ORDER — MORPHINE SULFATE 4 MG/ML
4 INJECTION, SOLUTION INTRAMUSCULAR; INTRAVENOUS ONCE
Status: COMPLETED | OUTPATIENT
Start: 2018-06-07 | End: 2018-06-07

## 2018-06-07 RX ORDER — ACETAMINOPHEN 325 MG/1
650 TABLET ORAL EVERY 6 HOURS PRN
Status: DISCONTINUED | OUTPATIENT
Start: 2018-06-07 | End: 2018-06-07 | Stop reason: HOSPADM

## 2018-06-07 RX ORDER — OXYCODONE HYDROCHLORIDE 5 MG/1
30 TABLET ORAL EVERY 6 HOURS PRN
Status: DISCONTINUED | OUTPATIENT
Start: 2018-06-07 | End: 2018-06-07 | Stop reason: HOSPADM

## 2018-06-07 RX ORDER — KETOROLAC TROMETHAMINE 30 MG/ML
15 INJECTION, SOLUTION INTRAMUSCULAR; INTRAVENOUS ONCE
Status: COMPLETED | OUTPATIENT
Start: 2018-06-07 | End: 2018-06-07

## 2018-06-07 RX ORDER — TRIAMCINOLONE ACETONIDE 5 MG/G
CREAM TOPICAL 3 TIMES DAILY
Status: DISCONTINUED | OUTPATIENT
Start: 2018-06-07 | End: 2018-06-07 | Stop reason: HOSPADM

## 2018-06-07 RX ADMIN — HYDROMORPHONE HYDROCHLORIDE 1 MG: 1 INJECTION, SOLUTION INTRAMUSCULAR; INTRAVENOUS; SUBCUTANEOUS at 08:39

## 2018-06-07 RX ADMIN — KETOROLAC TROMETHAMINE 15 MG: 30 INJECTION, SOLUTION INTRAMUSCULAR; INTRAVENOUS at 00:10

## 2018-06-07 RX ADMIN — HYDROMORPHONE HYDROCHLORIDE 1 MG: 1 INJECTION, SOLUTION INTRAMUSCULAR; INTRAVENOUS; SUBCUTANEOUS at 03:32

## 2018-06-07 RX ADMIN — MORPHINE SULFATE 4 MG: 4 INJECTION, SOLUTION INTRAMUSCULAR; INTRAVENOUS at 00:11

## 2018-06-07 RX ADMIN — PANTOPRAZOLE SODIUM 40 MG: 40 TABLET, DELAYED RELEASE ORAL at 05:38

## 2018-06-07 NOTE — H&P
H&P- Hernesto Hargrove 1965, 47 y o  male MRN: [id number]    Unit/Bed#: [address] [place] [address] Encounter: [id number]    Primary Care Provider: Edel Enciso MD   Date and time admitted to hospital: 6/6/2018  9:42 PM        * Crush injury   Assessment & Plan    Patient crushed left wrist under 280 pound oil tank  Concern for compartment syndrome  Perform q 2 hours neurovascular checks to left hand  Keep arm elevated on pillow  Radius fracture   Assessment & Plan    X-ray shows comminuted inch articular fracture of distal radial metaphysis with minimal displacement  Give Dilaudid 1 milligram p r n  pain  Splint to left arm  Inpatient consult to orthopedic surgery  VTE Prophylaxis: Low risk for VTE  / sequential compression device   Code Status: Full code  POLST: There is no POLST form on file for this patient (pre-hospital)  Discussion with family: No family present    Anticipated Length of Stay:  Patient will be admitted on an Observation basis with an anticipated length of stay of  < 2 midnights  Justification for Hospital Stay: Close neurovascular monitoring of L hand    Total Time for Visit, including Counseling / Coordination of Care: 30 minutes  Greater than 50% of this total time spent on direct patient counseling and coordination of care  Chief Complaint:   Arm injury    History of Present Illness:    Hernesto Hargrove is a 47 y o  male with history of spinal stenosis who presents with complaint of left arm pain  Patient was at work when a 280 pound oil tank fell on to his left wrist and arm  X-ray shows comminuted intra-articular fracture of distal radial metaphysis  ED physician spoke with Dr Anahy Toledo from Orthopedics who recommended Medicine admit to monitor for development of compartment syndrome  Patient able to move all fingers independently  No obvious since patient deficits  Review of Systems:    Review of Systems   Constitutional: Positive for activity change   Negative for appetite change, chills and fever  Respiratory: Negative for apnea, cough and shortness of breath  Cardiovascular: Negative for chest pain, palpitations and leg swelling  Gastrointestinal: Negative for abdominal distention, abdominal pain, constipation, diarrhea, nausea and vomiting  Genitourinary: Negative for dysuria  Musculoskeletal: Positive for arthralgias (Left Wrist pain)  Neurological: Negative for seizures, facial asymmetry, weakness, light-headedness, numbness and headaches  Psychiatric/Behavioral: Negative for agitation and confusion  The patient is not nervous/anxious  All other systems reviewed and are negative  Past Medical and Surgical History:     Past Medical History:   Diagnosis Date    Dyschromia     Idiopathic peripheral neuropathy     Spinal stenosis        History reviewed  No pertinent surgical history  Meds/Allergies:    Prior to Admission medications    Medication Sig Start Date End Date Taking? Authorizing Provider   amphetamine-dextroamphetamine (ADDERALL XR) 30 MG 24 hr capsule Take 2 capsules (60 mg total) by mouth daily Max Daily Amount: 60 mg  Patient taking differently: Take 60 mg by mouth every morning   5/11/18  Yes Ansel Cockayne, MD   oxyCODONE (ROXICODONE) 30 MG immediate release tablet every 6 (six) hours as needed   3/24/18  Yes Historical Provider, MD   triamcinolone (KENALOG) 0 5 % cream Apply topically 3 (three) times a day 7/8/16  Yes Historical Provider, MD   omeprazole (PriLOSEC) 40 MG capsule Take 1 capsule by mouth daily 12/8/17   Historical Provider, MD     I have reviewed home medications with patient personally      Allergies: No Known Allergies    Social History:     Marital Status: /Civil Union   Occupation: HVAC  Patient Pre-hospital Living Situation: Lives with wife and stepdaughter  Patient Pre-hospital Level of Mobility: Independent  Patient Pre-hospital Diet Restrictions: none  Substance Use History:   History   Alcohol Use No     History   Smoking Status    Current Every Day Smoker    Packs/day: 1 00    Types: Cigarettes   Smokeless Tobacco    Never Used     Comment: Smokes < 1 pack of cigarettes per day     History   Drug Use No       Family History:    non-contributory    Physical Exam:     Vitals:   Blood Pressure: 153/75 (06/07/18 0114)  Pulse: 70 (06/07/18 0114)  Temperature: 99 °F (37 2 °C) (06/07/18 0114)  Temp Source: Oral (06/07/18 0114)  Respirations: 18 (06/07/18 0114)  Height: 6' 2" (188 cm) (06/07/18 0114)  Weight - Scale: 86 2 kg (190 lb) (06/07/18 0114)  SpO2: 98 % (06/07/18 0114)    Physical Exam   Constitutional: He is oriented to person, place, and time  He appears well-developed and well-nourished  No distress  HENT:   Head: Normocephalic and atraumatic  Eyes: EOM are normal  Pupils are equal, round, and reactive to light  Neck: Normal range of motion  Neck supple  Cardiovascular: Normal rate, regular rhythm, normal heart sounds and intact distal pulses  Exam reveals no gallop and no friction rub  No murmur heard  Pulmonary/Chest: Effort normal and breath sounds normal  No respiratory distress  He has no wheezes  He has no rales  Abdominal: Soft  Bowel sounds are normal  He exhibits no distension  There is no tenderness  Musculoskeletal: He exhibits no edema  Left wrist: He exhibits decreased range of motion  Splint intact to L wrist and forearm   Neurological: He is alert and oriented to person, place, and time  No sensory deficit  Skin: Skin is warm and dry  Psychiatric: He has a normal mood and affect  Judgment normal    Nursing note and vitals reviewed  Additional Data:     Lab Results: I have personally reviewed pertinent reports          Results from last 7 days  Lab Units 06/07/18  0000   WBC Thousand/uL 11 54*   HEMOGLOBIN g/dL 12 8   HEMATOCRIT % 39 0   PLATELETS Thousands/uL 203   NEUTROS PCT % 60   LYMPHS PCT % 27   MONOS PCT % 8   EOS PCT % 4       Results from last 7 days  Lab Units 06/07/18  0000   SODIUM mmol/L 142   POTASSIUM mmol/L 3 8   CHLORIDE mmol/L 103   CO2 mmol/L 31   BUN mg/dL 21   CREATININE mg/dL 0 99   CALCIUM mg/dL 9 1   GLUCOSE RANDOM mg/dL 92                   Imaging: I have personally reviewed pertinent reports  XR elbow 3+ views LEFT   Final Result by Kely Quinn MD (06/07 0005)      Linear lucency in the medial condyle limited visualized on one view possibly indicating a nondisplaced fracture  CT may be helpful if there is persistent concern for acute elbow fracture  Workstation performed: [id number]         XR forearm 2 views LEFT   Final Result by Kely Quinn MD (06/07 0001)      Comminuted fracture of the distal radial metaphysis  Workstation performed: [id number]         XR wrist 3+ views LEFT   Final Result by Kely Quinn MD (06/06 2240)      Comminuted intra-articular fracture of the distal radial metaphysis  Workstation performed: [id number]             Allscripts / Epic Records Reviewed: Yes     ** Please Note: This note has been constructed using a voice recognition system   **

## 2018-06-07 NOTE — ASSESSMENT & PLAN NOTE
X-ray shows comminuted inch articular fracture of distal radial metaphysis with minimal displacement  Give Dilaudid 1 milligram p r n  pain  Splint to left arm  Inpatient consult to orthopedic surgery

## 2018-06-07 NOTE — PLAN OF CARE
Problem: Potential for Falls  Goal: Patient will remain free of falls  INTERVENTIONS:  - Assess patient frequently for physical needs  -  Identify cognitive and physical deficits and behaviors that affect risk of falls    -  [place] fall precautions as indicated by assessment   - Educate patient/family on patient safety including physical limitations  - Instruct patient to call for assistance with activity based on assessment  - Modify environment to reduce risk of injury  - Consider OT/PT consult to assist with strengthening/mobility   Outcome: Adequate for Discharge      Problem: PAIN - ADULT  Goal: Verbalizes/displays adequate comfort level or baseline comfort level  Interventions:  - Encourage patient to monitor pain and request assistance  - Assess pain using appropriate pain scale  - Administer analgesics based on type and severity of pain and evaluate response  - Implement non-pharmacological measures as appropriate and evaluate response  - Consider cultural and social influences on pain and pain management  - Notify physician/advanced practitioner if interventions unsuccessful or patient reports new pain   Outcome: Adequate for Discharge      Problem: INFECTION - ADULT  Goal: Absence or prevention of progression during hospitalization  INTERVENTIONS:  - Assess and monitor for signs and symptoms of infection  - Monitor lab/diagnostic results  - Monitor all insertion sites, i e  indwelling lines, tubes, and drains  - Monitor endotracheal (as able) and nasal secretions for changes in amount and color  - [place] appropriate cooling/warming therapies per order  - Administer medications as ordered  - Instruct and encourage patient and family to use good hand hygiene technique  - Identify and instruct in appropriate isolation precautions for identified infection/condition   Outcome: Adequate for Discharge    Goal: Absence of fever/infection during neutropenic period  INTERVENTIONS:  - Monitor WBC  - Implement neutropenic guidelines   Outcome: Adequate for Discharge      Problem: SAFETY ADULT  Goal: Maintain or return to baseline ADL function  INTERVENTIONS:  -  Assess patient's ability to carry out ADLs; assess patient's baseline for ADL function and identify physical deficits which impact ability to perform ADLs (bathing, care of mouth/teeth, toileting, grooming, dressing, etc )  - Assess/evaluate cause of self-care deficits   - Assess range of motion  - Assess patient's mobility; develop plan if impaired  - Assess patient's need for assistive devices and provide as appropriate  - Encourage maximum independence but intervene and supervise when necessary  ¯ Involve family in performance of ADLs  ¯ Assess for home care needs following discharge   ¯ Request OT consult to assist with ADL evaluation and planning for discharge  ¯ Provide patient education as appropriate   Outcome: Adequate for Discharge    Goal: Maintain or return mobility status to optimal level  INTERVENTIONS:  - Assess patient's baseline mobility status (ambulation, transfers, stairs, etc )    - Identify cognitive and physical deficits and behaviors that affect mobility  - Identify mobility aids required to assist with transfers and/or ambulation (gait belt, sit-to-stand, lift, walker, cane, etc )  - [place] fall precautions as indicated by assessment  - Record patient progress and toleration of activity level on Mobility SBAR; progress patient to next Phase/Stage  - Instruct patient to call for assistance with activity based on assessment  - Request Rehabilitation consult to assist with strengthening/weightbearing, etc    Outcome: Adequate for Discharge      Problem: DISCHARGE PLANNING  Goal: Discharge to home or other facility with appropriate resources  INTERVENTIONS:  - Identify barriers to discharge w/patient and caregiver  - Arrange for needed discharge resources and transportation as appropriate  - Identify discharge learning needs (meds, wound care, etc )  - Arrange for interpretive services to assist at discharge as needed  - Refer to Case Management Department for coordinating discharge planning if the patient needs post-hospital services based on physician/advanced practitioner order or complex needs related to functional status, cognitive ability, or social support system   Outcome: Adequate for Discharge      Problem: Knowledge Deficit  Goal: Patient/family/caregiver demonstrates understanding of disease process, treatment plan, medications, and discharge instructions  Complete learning assessment and assess knowledge base    Interventions:  - Provide teaching at level of understanding  - Provide teaching via preferred learning methods   Outcome: Adequate for Discharge

## 2018-06-07 NOTE — ASSESSMENT & PLAN NOTE
Patient crushed left wrist under 280 pound oil tank  Concern for compartment syndrome  Perform q 2 hours neurovascular checks to left hand  Keep arm elevated on pillow

## 2018-06-07 NOTE — ED NOTES
Provider at bedside  Updated pt on plan of care  Pt awake and alert  Wife at the bedside       Yesenia Hassan RN  06/06/18 8737

## 2018-06-07 NOTE — ED PROVIDER NOTES
History  Chief Complaint   Patient presents with    Arm Injury       History provided by:  Patient   used: No    Arm Injury   Associated symptoms: no fever and no neck pain      Pt is a 48 y/o male presenting to ED with left wrist pain  Was crushed by 280 lb oil tank  No head trauma  No loc  No n/v     mdm xray and pain control    Will reassess nerve function with pain control    Initially no improvement, but on reassessment pt has full function of mediana and ulnar nerves  Decreased strength with extending fingers, but able to do, painful when extending them actively  No pain or tenderness with passive rom of the fingers  Sensation is intact in all 3 nerve groups  Pulses intact    Discussed with Dr Julieta Rodriguez from [facility], requests medicine admit for monitoring for development of compartment syndrome    Will admit to medicine for monitoring and frequent neurovascaulr checks  Prior to Admission Medications   Prescriptions Last Dose Informant Patient Reported? Taking? amphetamine-dextroamphetamine (ADDERALL XR) 30 MG 24 hr capsule 6/6/2018 at Unknown time  No Yes   Sig: Take 2 capsules (60 mg total) by mouth daily Max Daily Amount: 60 mg   Patient taking differently: Take 60 mg by mouth every morning     omeprazole (PriLOSEC) 40 MG capsule Unknown at Unknown time  Yes No   Sig: Take 1 capsule by mouth daily   oxyCODONE (ROXICODONE) 30 MG immediate release tablet 6/6/2018 at Unknown time  Yes Yes   Sig: every 6 (six) hours as needed     triamcinolone (KENALOG) 0 5 % cream 6/5/2018 at Unknown time  Yes Yes   Sig: Apply topically 3 (three) times a day      Facility-Administered Medications: None       Past Medical History:   Diagnosis Date    Dyschromia     Idiopathic peripheral neuropathy     Spinal stenosis        History reviewed  No pertinent surgical history      Family History   Problem Relation Age of Onset    Diabetes Father      I have reviewed and agree with the history as documented  Social History   Substance Use Topics    Smoking status: Current Every Day Smoker     Packs/day: 1 00     Types: Cigarettes    Smokeless tobacco: Never Used      Comment: Smokes < 1 pack of cigarettes per day    Alcohol use No        Review of Systems   Constitutional: Negative for chills, diaphoresis and fever  Respiratory: Negative for cough, shortness of breath, wheezing and stridor  Cardiovascular: Negative for chest pain, palpitations and leg swelling  Gastrointestinal: Negative for abdominal pain, blood in stool, diarrhea, nausea and vomiting  Genitourinary: Negative for dysuria, frequency and urgency  Musculoskeletal: Negative for neck pain and neck stiffness  Skin: Negative for pallor and rash  Neurological: Negative for dizziness, syncope, weakness, light-headedness and headaches  All other systems reviewed and are negative  Physical Exam  Physical Exam   Constitutional: He is oriented to person, place, and time  He appears well-developed and well-nourished  HENT:   Head: Normocephalic and atraumatic  Eyes: Pupils are equal, round, and reactive to light  Neck: Neck supple  Cardiovascular: Normal rate, regular rhythm, normal heart sounds and intact distal pulses  Pulmonary/Chest: Effort normal and breath sounds normal  No respiratory distress  Abdominal: Soft  Bowel sounds are normal  There is no tenderness  Musculoskeletal:   Tenderness and swelling over the distal left radius, decreased rom of the hand with all 3 nerve groups, sensation intact   Neurological: He is alert and oriented to person, place, and time  Skin: Skin is warm and dry  Capillary refill takes less than 2 seconds  No erythema  No pallor  Vitals reviewed        Vital Signs  ED Triage Vitals [06/06/18 2150]   Temperature Pulse Respirations Blood Pressure SpO2   98 6 °F (37 °C) 83 16 144/74 100 %      Temp Source Heart Rate Source Patient Position - Orthostatic VS BP Location FiO2 (%) Tympanic Monitor Sitting Left arm --      Pain Score       8           Vitals:    06/06/18 2230 06/06/18 2345 06/07/18 0055 06/07/18 0114   BP:  138/76 135/70 153/75   Pulse: 79 80 80 70   Patient Position - Orthostatic VS:    Lying       Visual Acuity      ED Medications  Medications   amphetamine-dextroamphetamine (ADDERALL) tablet 60 mg (not administered)   pantoprazole (PROTONIX) EC tablet 40 mg (not administered)   triamcinolone (KENALOG) 0 5 % cream (not administered)   oxyCODONE (ROXICODONE) IR tablet 30 mg (not administered)   ondansetron (ZOFRAN) injection 4 mg (not administered)   acetaminophen (TYLENOL) tablet 650 mg (not administered)   HYDROmorphone (DILAUDID) injection 1 mg (not administered)   morphine (PF) 4 mg/mL injection 4 mg (4 mg Intramuscular Given 6/6/18 2215)   tetanus-diphtheria-acellular pertussis (BOOSTRIX) IM injection 0 5 mL (0 5 mL Intramuscular Given 6/6/18 2216)   ketorolac (TORADOL) injection 15 mg (15 mg Intravenous Given 6/7/18 0010)   morphine (PF) 4 mg/mL injection 4 mg (4 mg Intravenous Given 6/7/18 0011)       Diagnostic Studies  Results Reviewed     Procedure Component Value Units Date/Time    Basic metabolic panel [[id number]] Collected:  06/07/18 0000    Lab Status:  Final result Specimen:  Blood from Arm, Left Updated:  06/07/18 0036     Sodium 142 mmol/L      Potassium 3 8 mmol/L      Chloride 103 mmol/L      CO2 31 mmol/L      Anion Gap 8 mmol/L      BUN 21 mg/dL      Creatinine 0 99 mg/dL      Glucose 92 mg/dL      Calcium 9 1 mg/dL      eGFR 88 ml/min/1 73sq m     Narrative:         National Kidney Disease Education Program recommendations are as follows:  GFR calculation is accurate only with a steady state creatinine  Chronic Kidney disease less than 60 ml/min/1 73 sq  meters  Kidney failure less than 15 ml/min/1 73 sq  meters      CBC and differential [[id number]]  (Abnormal) Collected:  06/07/18 0000    Lab Status:  Final result Specimen:  Blood from Arm, Left Updated: 06/07/18 0020     WBC 11 54 (H) Thousand/uL      RBC 4 12 Million/uL      Hemoglobin 12 8 g/dL      Hematocrit 39 0 %      MCV 95 fL      MCH 31 1 pg      MCHC 32 8 g/dL      RDW 13 0 %      MPV 11 6 fL      Platelets 657 Thousands/uL      nRBC 0 /100 WBCs      Neutrophils Relative 60 %      Immat GRANS % 0 %      Lymphocytes Relative 27 %      Monocytes Relative 8 %      Eosinophils Relative 4 %      Basophils Relative 1 %      Neutrophils Absolute 6 94 Thousands/µL      Immature Grans Absolute 0 04 Thousand/uL      Lymphocytes Absolute 3 09 Thousands/µL      Monocytes Absolute 0 91 Thousand/µL      Eosinophils Absolute 0 47 Thousand/µL      Basophils Absolute 0 09 Thousands/µL                  XR elbow 3+ views LEFT   Final Result by Kale Hinson MD (06/07 0005)      Linear lucency in the medial condyle limited visualized on one view possibly indicating a nondisplaced fracture  CT may be helpful if there is persistent concern for acute elbow fracture  Workstation performed: [id number]         XR forearm 2 views LEFT   Final Result by Kale Hinson MD (06/07 0001)      Comminuted fracture of the distal radial metaphysis  Workstation performed: [id number]         XR wrist 3+ views LEFT   Final Result by Kale Hinson MD (06/06 2240)      Comminuted intra-articular fracture of the distal radial metaphysis              Workstation performed: [id number]                    Procedures  Static Splint Application  Date/Time: 6/7/2018 12:15 AM  Performed by: Elizabeth Frye  Authorized by: Elizabeth Frye     Patient location:  Bedside  Procedure performed by emergency physician: Yes    Other Assisting Provider: Yes (comment)    Consent:     Consent obtained:  Verbal    Consent given by:  Patient    Risks discussed:  Discoloration, numbness, swelling and pain    Alternatives discussed:  No treatment  Universal protocol:     Patient identity confirmed:  Verbally with patient  Indication:     Indications: fracture    Pre-procedure details:     Sensation:  Normal  Procedure details:     Laterality:  Left    Location:  Arm    Arm:  L lower arm    Splint type:  Sugar tong    Supplies:  Ortho-Glass  Post-procedure details:     Pain:  Unchanged    Sensation:  Normal    Neurovascular Exam: skin pink, capillary refill <2 sec, normal pulses and skin intact, warm, and dry      Patient tolerance of procedure: Tolerated well, no immediate complications           Phone Contacts  ED Phone Contact    ED Course  ED Course as of Jun 07 0213 Wed Jun 06, 2018   2331 Pt's function still not improving  I am concerned for crush injury  Waiting for call back from ortho  2344 Ortho paged, waiting for call back                                  [facility]  CritCare Time    Disposition  Final diagnoses:   Crush injury   Radius fracture     Time reflects when diagnosis was documented in both MDM as applicable and the Disposition within this note     Time User Action Codes Description Comment    6/7/2018 12:08 AM Candelaria Mendes [G28  8XXA] Crush injury     6/7/2018 12:09 AM Candelaria Mendes Add [S52 90XA] Radius fracture     6/7/2018  1:38 AM Mauro Trujillo [E05  8XXA] Crush injury     6/7/2018  1:38 AM Cristine Trujillo [S52 90XA] Radius fracture       ED Disposition     ED Disposition Condition Comment    Admit  Case was discussed with medicicne and the patient's admission status was agreed to be Admission Status: observation status to the service of Dr Erik Parikh           Follow-up Information    None         Current Discharge Medication List      CONTINUE these medications which have NOT CHANGED    Details   amphetamine-dextroamphetamine (ADDERALL XR) 30 MG 24 hr capsule Take 2 capsules (60 mg total) by mouth daily Max Daily Amount: 60 mg  Qty: 60 capsule, Refills: 0    Comments: DO NOT FILL BEFORE 5/11/18  Associated Diagnoses: Attention deficit disorder, unspecified hyperactivity presence      oxyCODONE (ROXICODONE) 30 MG immediate release tablet every 6 (six) hours as needed        triamcinolone (KENALOG) 0 5 % cream Apply topically 3 (three) times a day      omeprazole (PriLOSEC) 40 MG capsule Take 1 capsule by mouth daily           No discharge procedures on file      ED Provider  Electronically Signed by           Livan Naidu MD  06/07/18 6518       Livan Naidu MD  06/07/18 6341

## 2018-06-07 NOTE — DISCHARGE INSTRUCTIONS
1  NWB to LUE  Keep splint clean and dry  NO NOT REMOVE  2  Ice and elevation to LUE  3  Continue oxycodone 30 mg q 6 hours and discuss any other pain medication with current pain management provider  4  Keep fingers moving  5  Follow up in office in one week with Dr Shawn Morton

## 2018-06-07 NOTE — SOCIAL WORK
Met with Pt  Pt presents AA&Ox3  Discussed role of   Pt informed of observation status and signed observation form  Pt's wife at bedside  Pt lives at home with family  Pt is independent with adls and ambulation  Pt has DME but does not use it  Pt drives  Pt goes to [employer]  No needs anticipated  Plan to return home today

## 2018-06-07 NOTE — ED NOTES
Pt being splinted at this time  Sugar Tongue Splint applied to L  Forearm   Pt tolerated well     Sola Beard RN  06/07/18 3199

## 2018-06-07 NOTE — CASE MANAGEMENT
Initial Clinical Review    Admission: Date/Time/Statement: N/A @ N/A     Orders Placed This Encounter   Procedures    Place in Observation (expected length of stay for this patient is less than two midnights)     Standing Status:   Standing     Number of Occurrences:   1     Order Specific Question:   Admitting Physician     Answer:   Moris Benites [[id number]]     Order Specific Question:   Level of Care     Answer:   Med Surg [16]         ED: Date/Time/Mode of Arrival:   ED Arrival Information     Expected Arrival Acuity Means of Arrival Escorted By Service Admission Type    - 6/6/2018 20:04 Less Urgent Walk-In Self General Medicine Urgent    Arrival Complaint    Arm Injury          Chief Complaint:   Chief Complaint   Patient presents with    Arm Injury       History of Illness:   Pt is a 48 y/o male presenting to ED with left wrist pain  Was crushed by 280 lb oil tank  No head trauma  No loc  No n/v   mdm xray and pain control   Will reassess nerve function with pain control  Initially no improvement, but on reassessment pt has full function of mediana and ulnar nerves  Decreased strength with extending fingers, but able to do, painful when extending them actively  No pain or tenderness with passive rom of the fingers  Sensation is intact in all 3 nerve groups  Pulses intact  Discussed with Dr Romana Libel from [facility], requests medicine admit for monitoring for development of compartment syndrome  Will admit to medicine for monitoring and frequent neurovascaulr checks  ED Vital Signs:   ED Triage Vitals [06/06/18 2150]   Temperature Pulse Respirations Blood Pressure SpO2   98 6 °F (37 °C) 83 16 144/74 100 %      Temp Source Heart Rate Source Patient Position - Orthostatic VS BP Location FiO2 (%)   Tympanic Monitor Sitting Left arm --      Pain Score       8        Wt Readings from Last 1 Encounters:   06/07/18 87 kg (191 lb 12 8 oz)   Vital Signs (abnormal):    T 99  Abnormal Labs/Diagnostic Test Results:   WBC 11 54 L WRIST XRAY=Comminuted intra-articular fracture of the distal radial metaphysis  L ELBOW XRAY=Linear lucency in the medial condyle limited visualized on one view possibly indicating a nondisplaced fracture  CT may be helpful if there is persistent concern for acute elbow fracture  LFA XRAY=Comminuted fracture of the distal radial metaphysis  ED Treatment:   Medication Administration from 06/06/2018 2004 to 06/07/2018 0113       Date/Time Order Dose Route Action Action by Comments     06/06/2018 2215 morphine (PF) 4 mg/mL injection 4 mg 4 mg Intramuscular Given Rita Miller RN      06/06/2018 2216 tetanus-diphtheria-acellular pertussis (BOOSTRIX) IM injection 0 5 mL 0 5 mL Intramuscular Given Rita Miller RN      06/07/2018 0010 ketorolac (TORADOL) injection 15 mg 15 mg Intravenous Given Franc Bains RN      06/07/2018 0011 morphine (PF) 4 mg/mL injection 4 mg 4 mg Intravenous Given Franc Bains RN       Past Medical/Surgical History: Active Ambulatory Problems     Diagnosis Date Noted    Spinal stenosis      Resolved Ambulatory Problems     Diagnosis Date Noted    No Resolved Ambulatory Problems     Past Medical History:   Diagnosis Date    Dyschromia     Idiopathic peripheral neuropathy     Spinal stenosis        Admitting Diagnosis: Radius fracture [S52 90XA]  Crush injury [T14  8XXA]  Arm injury [S49 90XA]  Age/Sex: 47 y o  male  Assessment/Plan:   Crush injury   Assessment & Plan     Patient crushed left wrist under 280 pound oil tank  Concern for compartment syndrome  Perform q 2 hours neurovascular checks to left hand  Keep arm elevated on pillow           Radius fracture   Assessment & Plan     X-ray shows comminuted inch articular fracture of distal radial metaphysis with minimal displacement  Give Dilaudid 1 milligram p r n  pain  Splint to left arm  Inpatient consult to orthopedic surgery     Admission Orders:  TELEMETRY  NEUROVASCULAR CHECKS CONSULT ORTHO  Scheduled Meds: Current Facility-Administered Medications:  acetaminophen 650 mg Oral Q6H PRN RAIZA Gongora   amphetamine-dextroamphetamine 60 mg Oral Daily RAIZA Chew   HYDROmorphone 1 mg Intravenous Q4H PRN RAIZA Delaney   ondansetron 4 mg Intravenous Q6H PRN RAIZA Gongora   oxyCODONE 30 mg Oral Q6H PRN RAIZA Delaney   pantoprazole 40 mg Oral Early Morning RAIZA Chew   triamcinolone  Topical TID RAIZA Gongora     Continuous Infusions:    PRN Meds:   acetaminophen    HYDROmorphone    ondansetron    oxyCODONE

## 2018-06-07 NOTE — CONSULTS
Consultation - Orthopedics   Narda Piña 47 y o  male MRN: [id number]  Unit/Bed#: [address] [place] [address] Encounter: [id number]      Assessment/Plan     Assessment:  Left nondisplaced intraarticular distal radius fracture    Plan:  1  Continue sugar tong splint  Keep clean and dry  Must cover to bathe  2  Ice and elevation to LUE  3  Pain medication as needed  4  Encouraged finger ROM and stretching  5  Follow up in office in 1 week with Dr José Richard  History of Present Illness   Physician Requesting Consult: Omi Gonzalez MD  Reason for Consult / Principal Problem: left wrist injury  HPI: Narda Piña is a 47y o  year old male who presents with left wrist and forearm pain after suffering a work related injury yesterday  He states he was carrying a 300 lb oil tank down the stairs with someone else and his left arm got crushed in between the wall and the oil tank  He felt immediate pain  He went to the ED later and x-rays revealed a distal radius fracture  He was placed in a sugar tong splint  Patient was admitted to medical service and orthopedics was consulted  Pain is somewhat controlled  He normally takes pain medication for chronic back pain  He denies any previous injury to his left wrist      Inpatient consult to Orthopedic Surgery  Consult performed by: Delma Chino  Consult ordered by: Verenice Tran          Review of Systems   Constitutional: Negative  HENT: Negative  Eyes: Negative  Respiratory: Negative  Cardiovascular: Negative  Gastrointestinal: Negative  Endocrine: Negative  Genitourinary: Negative  Musculoskeletal: Positive for arthralgias and joint swelling  Skin: Negative  Allergic/Immunologic: Negative  Neurological: Negative  Hematological: Negative  Psychiatric/Behavioral: Negative  Historical Information   Past Medical History:   Diagnosis Date    Dyschromia     Idiopathic peripheral neuropathy     Spinal stenosis      History reviewed   No pertinent surgical history  Social History   History   Alcohol Use No     History   Drug Use No     History   Smoking Status    Current Every Day Smoker    Packs/day: 1 00    Types: Cigarettes   Smokeless Tobacco    Never Used     Comment: Smokes < 1 pack of cigarettes per day     Family History: non-contributory    Meds/Allergies   all current active meds have been reviewed  No Known Allergies    Objective   Vitals: Blood pressure 153/75, pulse 70, temperature 99 °F (37 2 °C), temperature source Oral, resp  rate 18, height 6' 2" (1 88 m), weight 86 2 kg (190 lb), SpO2 98 %  ,Body mass index is 24 39 kg/m²  No intake or output data in the 24 hours ending 06/07/18 0742  No intake/output data recorded  Invasive Devices     Peripheral Intravenous Line            Peripheral IV 06/07/18 Right Antecubital less than 1 day                Physical Exam   Constitutional: He is oriented to person, place, and time  He appears well-developed  HENT:   Head: Normocephalic and atraumatic  Eyes: EOM are normal  Pupils are equal, round, and reactive to light  Neck: Neck supple  Cardiovascular: Normal rate, regular rhythm and intact distal pulses  Pulmonary/Chest: Effort normal  No respiratory distress  Abdominal: Soft  There is no tenderness  Neurological: He is alert and oriented to person, place, and time  Skin: Skin is warm  Psychiatric: He has a normal mood and affect  Nursing note and vitals reviewed  Right Hand Exam   Right hand exam is normal       Left Hand Exam     Other   Sensation: normal    Comments:  Sugar tong splint in place  No evidence of loosening or skin breakdown  Moving all fingers  Capillary refill brisk  No tenderness proximal to splint  Lab Results: I have personally reviewed pertinent lab results    Imaging Studies: I have personally reviewed pertinent films in PACS  X-ray left wrist: nondisplaced intraarticular distal radius fracture    Code Status: Level 1 - Full Code  Advance Directive and Living Will:      Power of :    POLST:      Counseling / Coordination of Care  Total floor / unit time spent today 20 minutes  Greater than 50% of total time was spent with the patient and / or family counseling and / or coordination of care

## 2018-06-07 NOTE — DISCHARGE SUMMARY
Discharge Summary  José Horton 47 y o  male MRN: [id number]  Unit/Bed#: [address] Encounter: [id number]    Admission Date:   Admission Orders     Ordered        06/07/18 0009  Place in Observation (expected length of stay for this patient is less than two midnights)  Once               Discharge Date: 06/07/18    Admitting Diagnosis: Radius fracture [S52 90XA]  Crush injury [T14  8XXA]  Arm injury [S49 90XA]    HPI:  See history and physical    Procedures Performed:   Orders Placed This Encounter   Procedures    Splint application       Hospital Course:  Patient admitted with crush injury to the radius  This is a left arm  He he was admitted for observation until he could be evaluated by Orthopedics  He was seen by Orthopedics and cleared for discharge  The patient is on chronic pain medications followed by pain specialist   He will continue those medications  Need to say he will be followed by Orthopedics        Significant Findings, Care, Treatment and Services Provided:  None    Discharge Diagnosis: Principal Problem:    Crush injury  Active Problems:    Radius fracture  Resolved Problems:    * No resolved hospital problems  *        Condition at Discharge: stable     Discharge instructions/Information to patient and family:   See after visit summary for information provided to patient and family  Provisions for Follow-Up Care:  See after visit summary for information related to follow-up care and any pertinent home health orders  Disposition: Home    Discharge Medications:  See after visit summary for reconciled discharge medications provided to patient and family  This note has been constructed using a voice recognition system         Cristian Krueger MD

## 2018-06-11 DIAGNOSIS — F98.8 ATTENTION DEFICIT DISORDER, UNSPECIFIED HYPERACTIVITY PRESENCE: ICD-10-CM

## 2018-06-11 RX ORDER — DEXTROAMPHETAMINE SACCHARATE, AMPHETAMINE ASPARTATE MONOHYDRATE, DEXTROAMPHETAMINE SULFATE AND AMPHETAMINE SULFATE 7.5; 7.5; 7.5; 7.5 MG/1; MG/1; MG/1; MG/1
60 CAPSULE, EXTENDED RELEASE ORAL DAILY
Qty: 60 CAPSULE | Refills: 0 | Status: SHIPPED | OUTPATIENT
Start: 2018-06-11 | End: 2018-07-11 | Stop reason: SDUPTHER

## 2018-07-11 DIAGNOSIS — F98.8 ATTENTION DEFICIT DISORDER, UNSPECIFIED HYPERACTIVITY PRESENCE: ICD-10-CM

## 2018-07-11 RX ORDER — DEXTROAMPHETAMINE SACCHARATE, AMPHETAMINE ASPARTATE MONOHYDRATE, DEXTROAMPHETAMINE SULFATE AND AMPHETAMINE SULFATE 7.5; 7.5; 7.5; 7.5 MG/1; MG/1; MG/1; MG/1
60 CAPSULE, EXTENDED RELEASE ORAL DAILY
Qty: 60 CAPSULE | Refills: 0 | Status: SHIPPED | OUTPATIENT
Start: 2018-07-11 | End: 2018-08-14 | Stop reason: SDUPTHER

## 2018-07-26 ENCOUNTER — EVALUATION (OUTPATIENT)
Dept: OCCUPATIONAL THERAPY | Facility: CLINIC | Age: 53
End: 2018-07-26
Payer: COMMERCIAL

## 2018-07-26 DIAGNOSIS — M25.532 LEFT WRIST PAIN: Primary | ICD-10-CM

## 2018-07-26 DIAGNOSIS — S52.572D RADIUS FRACTURE DIE PUNCH, CLOSED, LEFT, WITH ROUTINE HEALING, SUBSEQUENT ENCOUNTER: ICD-10-CM

## 2018-07-26 PROCEDURE — 97110 THERAPEUTIC EXERCISES: CPT | Performed by: OCCUPATIONAL THERAPIST

## 2018-07-26 PROCEDURE — 97165 OT EVAL LOW COMPLEX 30 MIN: CPT | Performed by: OCCUPATIONAL THERAPIST

## 2018-07-26 PROCEDURE — G8985 CARRY GOAL STATUS: HCPCS | Performed by: OCCUPATIONAL THERAPIST

## 2018-07-26 PROCEDURE — G8984 CARRY CURRENT STATUS: HCPCS | Performed by: OCCUPATIONAL THERAPIST

## 2018-07-26 NOTE — PROGRESS NOTES
OT Evaluation     Today's date: 2018  Patient name: Heriberto Cervantes  : 1965  MRN: [id number]  Referring provider: Jamari Clement MD  Dx:   Encounter Diagnosis     ICD-10-CM    1  Left wrist pain M25 532    2  Radius fracture die punch, closed, left, with routine healing, subsequent encounter S52 572D        Start Time: 1425  Stop Time: 1515  Total time in clinic (min): 50 minutes    Assessment    Assessment details: 47year old male presents with crush injury and subsequent wrist fracture that was cast for 6 weeks  Physically, he presents with increased pain, some swelling and decreased ROM / strength  Functionally, he wears a splint during hours of work  Patient notes he tries to perform tasks one handed  He is unable to lift tool bag which is 50 pounds and reports driving with left hand is painful  Patient would benefit from skilled OT to return to normal work duties and resume independent lifestyle  Understanding of Dx/Px/POC: good   Prognosis: good    Goals  STG:  In 3 weeks  1  Decreased pain to 3-4 /10 when performing ADL's   2   Increased ROM 5-15 degrees in order to independently perform light weight IADL's   3   Improve FOTO score 10%    LTG's 8 weeks  1    Patient will report minimal to no pain with all activities  2   Sufficient strength to perform ADL's and IADL's      Plan  Patient would benefit from: custom splinting, orthotics, OT eval and skilled occupational therapy  Planned modality interventions: TENS, ultrasound, thermotherapy: hydrocollator packs, thermotherapy: paraffin bath, fluidotherapy and cryotherapy  Planned therapy interventions: joint mobilization, manual therapy, massage, neuromuscular re-education, orthotic fitting/training, orthotic management and training, patient education, strengthening, stretching, therapeutic activities, therapeutic exercise, graded activity, graded exercise, home exercise program and functional ROM exercises  Frequency: 2x week  Duration in weeks: 6  Treatment plan discussed with: patient        Subjective Evaluation    History of Present Illness  Date of onset: 2018  Mechanism of injury: trauma  Mechanism of injury: Carrying a 300# tank down the steps and the wall came out about three inches and the tank crushed left hand into the wall  Sustaining a crush injury and wrist fracture  He was placed in a cast for six weeks  He reports he was wearing a wrist when he was working  MD states " do the most you can unless it hurts "  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  Not a recurrent problem   Quality of life: good    Pain  Current pain ratin  At best pain ratin  At worst pain rating: 10  Location: Diffuse around the wrist area  Quality: sharp, burning, needle-like and pressure  Relieving factors: rest and support  Aggravating factors: lifting  Progression: no change    Social Support  Steps to enter house: no  Stairs in house: yes   Lives in: multiple-level home  Lives with: spouse and adult children    Employment status: working  Hand dominance: right      Diagnostic Tests  X-ray: abnormal  Treatments  No previous or current treatments  Patient Goals  Patient goal: To get full movement on the wrist         Objective     Palpation   Left   Tenderness of the extensor carpi ulnaris       Tenderness     Additional Tenderness Details  DRUJ tenderness with palpation most specifically at ulnar styloid and along ECU    Active Range of Motion     Left Elbow   Normal active range of motion    Left Wrist   Wrist flexion: 45 degrees   Wrist extension: 38 degrees   Radial deviation: 20 degrees   Ulnar deviation: 15 degrees     Right Wrist   Wrist flexion: 65 degrees   Wrist extension: 65 degrees   Radial deviation: 25 degrees   Ulnar deviation: 25 degrees     Left Thumb   Flexion     MP: 50 degrees    DIP: 60 degrees    Right Thumb   Flexion     MP: 65    DIP: 66    Additional Active Range of Motion Details  Pain with end range pronation > then supination  Notes severe pain with UD  Tests     Left Elbow   Negative Tinel's sign (cubital tunnel)  Left Wrist/Hand   Negative resisted middle finger, Tinel's sign (medial nerve) and Tinel's sign (radial tunnel)       Swelling     Left Wrist/Hand   Circumference wrist: 19 cm    Right Wrist/Hand   Circumference wrist: 18 6 cm      Flowsheet Rows      Most Recent Value   PT/OT G-Codes   Current Score  53   Projected Score  69   FOTO information reviewed  Yes   Assessment Type  Evaluation   G code set  Carrying, Moving & Handling Objects   Carrying, Moving and Handling Objects Current Status ()  CK   Carrying, Moving and Handling Objects Goal Status ()  CJ          Precautions: Wrist fracture protocol    Daily Treatment Diary     Manual                                                                                   Exercise Diary                                                                                                                                                                                                                                                                                      Modalities

## 2018-07-26 NOTE — LETTER
2018    MD Altagracia Hagen  [address]    Patient: Heriberto Cervantes   YOB: 1965   Date of Visit: 2018     Encounter Diagnosis     ICD-10-CM    1  Left wrist pain M25 532    2  Radius fracture die punch, closed, left, with routine healing, subsequent encounter S52 572D        Dear Dr Maria Elena Carrasquillo:    Please review the attached Plan of Care from Lawrence Roque's recent visit  Please verify that you agree therapy should continue by signing the attached document and sending it back to our office  If you have any questions or concerns, please don't hesitate to call  Sincerely,    Loren Gamez, OT      Referring Provider:     I certify that I have read the below Plan of Care and certify the need for these services furnished under this plan of treatment while under my care  MD Altagracia Hagen [address]: [phone]        OT Evaluation     Today's date: 2018  Patient name: Heriberto Cervantes  : 1965  MRN: [id number]  Referring provider: Jamari Clement MD  Dx:   Encounter Diagnosis     ICD-10-CM    1  Left wrist pain M25 532    2  Radius fracture die punch, closed, left, with routine healing, subsequent encounter S52 572D        Start Time: 1425  Stop Time: 1515  Total time in clinic (min): 50 minutes    Assessment    Assessment details: 47year old male presents with crush injury and subsequent wrist fracture that was cast for 6 weeks  Physically, he presents with increased pain, some swelling and decreased ROM / strength  Functionally, he wears a splint during hours of work  Patient notes he tries to perform tasks one handed  He is unable to lift tool bag which is 50 pounds and reports driving with left hand is painful  Patient would benefit from skilled OT to return to normal work duties and resume independent lifestyle      Understanding of Dx/Px/POC: good   Prognosis: good    Goals  STG:  In 3 weeks  1  Decreased pain to 3-4 /10 when performing ADL's   2   Increased ROM 5-15 degrees in order to independently perform light weight IADL's   3   Improve FOTO score 10%    LTG's 8 weeks  1    Patient will report minimal to no pain with all activities  2   Sufficient strength to perform ADL's and IADL's  Plan  Patient would benefit from: custom splinting, orthotics, OT eval and skilled occupational therapy  Planned modality interventions: TENS, ultrasound, thermotherapy: hydrocollator packs, thermotherapy: paraffin bath, fluidotherapy and cryotherapy  Planned therapy interventions: joint mobilization, manual therapy, massage, neuromuscular re-education, orthotic fitting/training, orthotic management and training, patient education, strengthening, stretching, therapeutic activities, therapeutic exercise, graded activity, graded exercise, home exercise program and functional ROM exercises  Frequency: 2x week  Duration in weeks: 6  Treatment plan discussed with: patient        Subjective Evaluation    History of Present Illness  Date of onset: 2018  Mechanism of injury: trauma  Mechanism of injury: Carrying a 300# tank down the steps and the wall came out about three inches and the tank crushed left hand into the wall  Sustaining a crush injury and wrist fracture  He was placed in a cast for six weeks  He reports he was wearing a wrist when he was working  MD states " do the most you can unless it hurts "  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      Not a recurrent problem   Quality of life: good    Pain  Current pain ratin  At best pain ratin  At worst pain rating: 10  Location: Diffuse around the wrist area  Quality: sharp, burning, needle-like and pressure  Relieving factors: rest and support  Aggravating factors: lifting  Progression: no change    Social Support  Steps to enter house: no  Stairs in house: yes   Lives in: multiple-level home  Lives with: spouse and adult children    Employment status: working  Hand dominance: right      Diagnostic Tests  X-ray: abnormal  Treatments  No previous or current treatments  Patient Goals  Patient goal: To get full movement on the wrist         Objective     Palpation   Left   Tenderness of the extensor carpi ulnaris  Tenderness     Additional Tenderness Details  DRUJ tenderness with palpation most specifically at ulnar styloid and along ECU    Active Range of Motion     Left Elbow   Normal active range of motion    Left Wrist   Wrist flexion: 45 degrees   Wrist extension: 38 degrees   Radial deviation: 20 degrees   Ulnar deviation: 15 degrees     Right Wrist   Wrist flexion: 65 degrees   Wrist extension: 65 degrees   Radial deviation: 25 degrees   Ulnar deviation: 25 degrees     Left Thumb   Flexion     MP: 50 degrees    DIP: 60 degrees    Right Thumb   Flexion     MP: 65    DIP: 66    Additional Active Range of Motion Details  Pain with end range pronation > then supination  Notes severe pain with UD  Tests     Left Elbow   Negative Tinel's sign (cubital tunnel)  Left Wrist/Hand   Negative resisted middle finger, Tinel's sign (medial nerve) and Tinel's sign (radial tunnel)       Swelling     Left Wrist/Hand   Circumference wrist: 19 cm    Right Wrist/Hand   Circumference wrist: 18 6 cm      Flowsheet Rows      Most Recent Value   PT/OT G-Codes   Current Score  53   Projected Score  69   FOTO information reviewed  Yes   Assessment Type  Evaluation   G code set  Carrying, Moving & Handling Objects   Carrying, Moving and Handling Objects Current Status ()  CK   Carrying, Moving and Handling Objects Goal Status ()  CJ          Precautions: Wrist fracture protocol    Daily Treatment Diary     Manual                                                                                   Exercise Diary Modalities

## 2018-07-27 ENCOUNTER — TELEPHONE (OUTPATIENT)
Dept: FAMILY MEDICINE CLINIC | Facility: CLINIC | Age: 53
End: 2018-07-27

## 2018-07-27 NOTE — TELEPHONE ENCOUNTER
Referral #: [id number]  Effective: 07/26/2018  Expires: 10/23/2018    Referral [id number] has been successfully submitted

## 2018-08-02 ENCOUNTER — APPOINTMENT (OUTPATIENT)
Dept: OCCUPATIONAL THERAPY | Facility: CLINIC | Age: 53
End: 2018-08-02
Payer: COMMERCIAL

## 2018-08-13 ENCOUNTER — TELEPHONE (OUTPATIENT)
Dept: FAMILY MEDICINE CLINIC | Facility: CLINIC | Age: 53
End: 2018-08-13

## 2018-08-13 ENCOUNTER — OFFICE VISIT (OUTPATIENT)
Dept: OCCUPATIONAL THERAPY | Facility: CLINIC | Age: 53
End: 2018-08-13
Payer: COMMERCIAL

## 2018-08-13 DIAGNOSIS — M25.532 LEFT WRIST PAIN: Primary | ICD-10-CM

## 2018-08-13 PROCEDURE — 97110 THERAPEUTIC EXERCISES: CPT | Performed by: OCCUPATIONAL THERAPIST

## 2018-08-13 PROCEDURE — 97140 MANUAL THERAPY 1/> REGIONS: CPT | Performed by: OCCUPATIONAL THERAPIST

## 2018-08-13 PROCEDURE — 97022 WHIRLPOOL THERAPY: CPT | Performed by: OCCUPATIONAL THERAPIST

## 2018-08-13 NOTE — PROGRESS NOTES
Daily Note /discharge    Today's date: 2018  Patient name: Mia Elena  : 1965  MRN: [id number]  Referring provider: Sherry Borrego MD  Dx:   Encounter Diagnosis     ICD-10-CM    1  Left wrist pain M25 532                   Subjective: I am getting shooting and burning pain       Objective: See treatment diary below      Assessment: Tolerated treatment well  Patient has increased pain levels  He has parasthesias medial FA      Plan: Continue per plan of care          Daily Treatment Diary     Manual              Retrograde  2m            graston wrist/hand 4m            MOB wrist  4m                                          Exercise Diary              AROM wrist 10x            AROM digits  10x            Wrist e/f 3#  3x10            flexbar p/s Red 3x10            powerweb  Blue   3x10                                                                                                                                                                                                                   Modalities                                                         Pt has not been seen for > 2 wks - discharge

## 2018-08-13 NOTE — TELEPHONE ENCOUNTER
Per Dawit Smith from [facility] for Dr Alana Johnson- [phone], needs ref for emg    Apt on 8/15

## 2018-08-14 DIAGNOSIS — F98.8 ATTENTION DEFICIT DISORDER, UNSPECIFIED HYPERACTIVITY PRESENCE: ICD-10-CM

## 2018-08-14 RX ORDER — DEXTROAMPHETAMINE SACCHARATE, AMPHETAMINE ASPARTATE MONOHYDRATE, DEXTROAMPHETAMINE SULFATE AND AMPHETAMINE SULFATE 7.5; 7.5; 7.5; 7.5 MG/1; MG/1; MG/1; MG/1
60 CAPSULE, EXTENDED RELEASE ORAL DAILY
Qty: 60 CAPSULE | Refills: 0 | Status: SHIPPED | OUTPATIENT
Start: 2018-08-14 | End: 2018-09-14 | Stop reason: SDUPTHER

## 2018-08-15 PROCEDURE — G8991 OTHER PT/OT GOAL STATUS: HCPCS | Performed by: OCCUPATIONAL THERAPIST

## 2018-08-15 PROCEDURE — G8990 OTHER PT/OT CURRENT STATUS: HCPCS | Performed by: OCCUPATIONAL THERAPIST

## 2018-08-23 ENCOUNTER — APPOINTMENT (OUTPATIENT)
Dept: OCCUPATIONAL THERAPY | Facility: CLINIC | Age: 53
End: 2018-08-23
Payer: COMMERCIAL

## 2018-08-27 ENCOUNTER — APPOINTMENT (OUTPATIENT)
Dept: OCCUPATIONAL THERAPY | Facility: CLINIC | Age: 53
End: 2018-08-27
Payer: COMMERCIAL

## 2018-08-30 ENCOUNTER — APPOINTMENT (OUTPATIENT)
Dept: OCCUPATIONAL THERAPY | Facility: CLINIC | Age: 53
End: 2018-08-30
Payer: COMMERCIAL

## 2018-09-14 DIAGNOSIS — F98.8 ATTENTION DEFICIT DISORDER, UNSPECIFIED HYPERACTIVITY PRESENCE: ICD-10-CM

## 2018-09-14 DIAGNOSIS — Z12.5 SCREENING PSA (PROSTATE SPECIFIC ANTIGEN): ICD-10-CM

## 2018-09-14 DIAGNOSIS — Z13.6 SCREENING FOR CARDIOVASCULAR CONDITION: Primary | ICD-10-CM

## 2018-09-14 RX ORDER — DEXTROAMPHETAMINE SACCHARATE, AMPHETAMINE ASPARTATE MONOHYDRATE, DEXTROAMPHETAMINE SULFATE AND AMPHETAMINE SULFATE 7.5; 7.5; 7.5; 7.5 MG/1; MG/1; MG/1; MG/1
60 CAPSULE, EXTENDED RELEASE ORAL DAILY
Qty: 60 CAPSULE | Refills: 0 | Status: SHIPPED | OUTPATIENT
Start: 2018-09-14 | End: 2018-10-22 | Stop reason: SDUPTHER

## 2018-10-02 ENCOUNTER — TELEPHONE (OUTPATIENT)
Dept: FAMILY MEDICINE CLINIC | Facility: CLINIC | Age: 53
End: 2018-10-02

## 2018-10-19 LAB
ALBUMIN SERPL-MCNC: 4 G/DL (ref 3.5–5.5)
ALBUMIN/GLOB SERPL: 1.7 {RATIO} (ref 1.2–2.2)
ALP SERPL-CCNC: 119 IU/L (ref 39–117)
ALT SERPL-CCNC: 16 IU/L (ref 0–44)
AST SERPL-CCNC: 18 IU/L (ref 0–40)
BILIRUB SERPL-MCNC: <0.2 MG/DL (ref 0–1.2)
BUN SERPL-MCNC: 9 MG/DL (ref 6–24)
BUN/CREAT SERPL: 13 (ref 9–20)
CALCIUM SERPL-MCNC: 9.1 MG/DL (ref 8.7–10.2)
CHLORIDE SERPL-SCNC: 103 MMOL/L (ref 96–106)
CHOLEST SERPL-MCNC: 162 MG/DL (ref 100–199)
CHOLEST/HDLC SERPL: 4.3 RATIO (ref 0–5)
CO2 SERPL-SCNC: 28 MMOL/L (ref 20–29)
CREAT SERPL-MCNC: 0.71 MG/DL (ref 0.76–1.27)
GLOBULIN SER-MCNC: 2.3 G/DL (ref 1.5–4.5)
GLUCOSE SERPL-MCNC: 91 MG/DL (ref 65–99)
HDLC SERPL-MCNC: 38 MG/DL
LDLC SERPL CALC-MCNC: 75 MG/DL (ref 0–99)
POTASSIUM SERPL-SCNC: 4.4 MMOL/L (ref 3.5–5.2)
PROT SERPL-MCNC: 6.3 G/DL (ref 6–8.5)
PSA SERPL-MCNC: 0.5 NG/ML (ref 0–4)
SL AMB EGFR AFRICAN AMERICAN: 126 ML/MIN/1.73
SL AMB EGFR NON AFRICAN AMERICAN: 109 ML/MIN/1.73
SL AMB REFLEX CRITERIA: NORMAL
SL AMB VLDL CHOLESTEROL CALC: 49 MG/DL (ref 5–40)
SODIUM SERPL-SCNC: 144 MMOL/L (ref 134–144)
TRIGL SERPL-MCNC: 243 MG/DL (ref 0–149)

## 2018-10-22 ENCOUNTER — TELEPHONE (OUTPATIENT)
Dept: FAMILY MEDICINE CLINIC | Facility: CLINIC | Age: 53
End: 2018-10-22

## 2018-10-22 DIAGNOSIS — F98.8 ATTENTION DEFICIT DISORDER, UNSPECIFIED HYPERACTIVITY PRESENCE: ICD-10-CM

## 2018-10-23 RX ORDER — DEXTROAMPHETAMINE SACCHARATE, AMPHETAMINE ASPARTATE MONOHYDRATE, DEXTROAMPHETAMINE SULFATE AND AMPHETAMINE SULFATE 7.5; 7.5; 7.5; 7.5 MG/1; MG/1; MG/1; MG/1
60 CAPSULE, EXTENDED RELEASE ORAL DAILY
Qty: 60 CAPSULE | Refills: 0 | Status: SHIPPED | OUTPATIENT
Start: 2018-10-23 | End: 2018-12-04 | Stop reason: SDUPTHER

## 2018-10-25 ENCOUNTER — OFFICE VISIT (OUTPATIENT)
Dept: FAMILY MEDICINE CLINIC | Facility: CLINIC | Age: 53
End: 2018-10-25
Payer: COMMERCIAL

## 2018-10-25 VITALS
OXYGEN SATURATION: 96 % | HEART RATE: 98 BPM | BODY MASS INDEX: 24.13 KG/M2 | WEIGHT: 188 LBS | SYSTOLIC BLOOD PRESSURE: 132 MMHG | HEIGHT: 74 IN | DIASTOLIC BLOOD PRESSURE: 76 MMHG

## 2018-10-25 DIAGNOSIS — F98.8 ATTENTION DEFICIT DISORDER (ADD) WITHOUT HYPERACTIVITY: ICD-10-CM

## 2018-10-25 DIAGNOSIS — J01.00 ACUTE NON-RECURRENT MAXILLARY SINUSITIS: ICD-10-CM

## 2018-10-25 DIAGNOSIS — M48.07 SPINAL STENOSIS OF LUMBOSACRAL REGION: Primary | ICD-10-CM

## 2018-10-25 DIAGNOSIS — E78.01 FAMILIAL HYPERCHOLESTEROLEMIA: ICD-10-CM

## 2018-10-25 PROCEDURE — 99214 OFFICE O/P EST MOD 30 MIN: CPT | Performed by: FAMILY MEDICINE

## 2018-10-25 RX ORDER — OXYCODONE HYDROCHLORIDE 15 MG/1
TABLET ORAL
COMMUNITY
Start: 2018-10-02 | End: 2020-03-10 | Stop reason: ALTCHOICE

## 2018-10-25 RX ORDER — METHADONE HYDROCHLORIDE 10 MG/1
TABLET ORAL
COMMUNITY
Start: 2018-10-02 | End: 2020-03-10 | Stop reason: ALTCHOICE

## 2018-10-25 RX ORDER — CLARITHROMYCIN 500 MG/1
500 TABLET, COATED ORAL EVERY 12 HOURS SCHEDULED
Qty: 20 TABLET | Refills: 0 | Status: SHIPPED | OUTPATIENT
Start: 2018-10-25 | End: 2018-11-04

## 2018-10-25 NOTE — PROGRESS NOTES
[address]         NAME: Khai Hassan is a 47 y o  male  : 1965    MRN: [id number]  DATE: 2018  TIME: 8:46 AM    Assessment and Plan   Spinal stenosis of lumbosacral region [M48 07]  1  Spinal stenosis of lumbosacral region     2  Attention deficit disorder (ADD) without hyperactivity     3  Familial hypercholesterolemia           Patient Instructions     Patient Instructions   Low carb diet          Chief Complaint     Chief Complaint   Patient presents with    Follow-up     6mos med-check--ADD    Cold Like Symptoms     chest congestion, ear pain & body aches         History of Present Illness       C/osinus Sx/6mo F/u        Review of Systems   Review of Systems   Constitutional: Negative for fatigue, fever and unexpected weight change  HENT: Positive for congestion, ear pain and postnasal drip  Negative for sinus pressure and sore throat  Eyes: Negative for visual disturbance  Respiratory: Negative for shortness of breath and wheezing  Cardiovascular: Negative for chest pain and palpitations  Gastrointestinal: Negative for abdominal pain, diarrhea, nausea and vomiting           Current Medications       Current Outpatient Prescriptions:     amphetamine-dextroamphetamine (ADDERALL XR) 30 MG 24 hr capsule, Take 2 capsules (60 mg total) by mouth daily Max Daily Amount: 60 mg, Disp: 60 capsule, Rfl: 0    methadone (DOLOPHINE) 10 mg tablet, , Disp: , Rfl:     omeprazole (PriLOSEC) 40 MG capsule, Take 1 capsule by mouth daily, Disp: , Rfl:     oxyCODONE (ROXICODONE) 15 mg immediate release tablet, , Disp: , Rfl:     triamcinolone (KENALOG) 0 5 % cream, Apply topically 3 (three) times a day, Disp: , Rfl:     Current Allergies     Allergies as of 10/25/2018    (No Known Allergies)            The following portions of the patient's history were reviewed and updated as appropriate: allergies, current medications, past family history, past medical history, past social history, past surgical history and problem list      Past Medical History:   Diagnosis Date    Dyschromia     Idiopathic peripheral neuropathy     Spinal stenosis        No past surgical history on file  Family History   Problem Relation Age of Onset    Diabetes Father          Medications have been verified  Objective   /76   Pulse 98   Ht 6' 2" (1 88 m)   Wt 85 3 kg (188 lb)   SpO2 96%   BMI 24 14 kg/m²        Physical Exam     Physical Exam   Constitutional: He appears well-developed and well-nourished  No distress  HENT:   Right Ear: Tympanic membrane, external ear and ear canal normal  Tympanic membrane is not injected  Left Ear: Tympanic membrane, external ear and ear canal normal  Tympanic membrane is not injected  Nose: Nose normal    Mouth/Throat: Uvula is midline, oropharynx is clear and moist and mucous membranes are normal    Eyes: Pupils are equal, round, and reactive to light  Conjunctivae are normal    Neck: Normal range of motion  Neck supple  No thyromegaly present  Cardiovascular: Normal rate, regular rhythm and normal heart sounds  No murmur heard  Pulmonary/Chest: Effort normal and breath sounds normal  No respiratory distress  He has no wheezes  Lymphadenopathy:     He has no cervical adenopathy  Skin: He is not diaphoretic       L "lg bubble" on TM

## 2018-11-09 ENCOUNTER — OFFICE VISIT (OUTPATIENT)
Dept: FAMILY MEDICINE CLINIC | Facility: CLINIC | Age: 53
End: 2018-11-09
Payer: COMMERCIAL

## 2018-11-09 VITALS
WEIGHT: 188 LBS | HEART RATE: 92 BPM | BODY MASS INDEX: 24.13 KG/M2 | DIASTOLIC BLOOD PRESSURE: 78 MMHG | HEIGHT: 74 IN | SYSTOLIC BLOOD PRESSURE: 122 MMHG | OXYGEN SATURATION: 98 %

## 2018-11-09 DIAGNOSIS — R53.83 FATIGUE, UNSPECIFIED TYPE: Primary | ICD-10-CM

## 2018-11-09 DIAGNOSIS — E53.8 B12 DEFICIENCY: ICD-10-CM

## 2018-11-09 DIAGNOSIS — M60.9 MYOSITIS, UNSPECIFIED MYOSITIS TYPE, UNSPECIFIED SITE: ICD-10-CM

## 2018-11-09 DIAGNOSIS — L72.3 SEBACEOUS CYST: ICD-10-CM

## 2018-11-09 PROCEDURE — 11400 EXC TR-EXT B9+MARG 0.5 CM<: CPT | Performed by: FAMILY MEDICINE

## 2018-11-09 PROCEDURE — 99213 OFFICE O/P EST LOW 20 MIN: CPT | Performed by: FAMILY MEDICINE

## 2018-11-09 PROCEDURE — 3008F BODY MASS INDEX DOCD: CPT | Performed by: FAMILY MEDICINE

## 2018-11-09 NOTE — PROGRESS NOTES
[facility]        NAME: Jose Manzanares is a 47 y o  male  : 1965    MRN: [id number]  DATE: 2018  TIME: 1:35 PM    Assessment and Plan   Fatigue, unspecified type [R53 83]  1  Fatigue, unspecified type  CBC and differential    TSH, 3rd generation    Sedimentation rate, automated    CBC and differential    TSH, 3rd generation    Sedimentation rate, automated   2  B12 deficiency  Vitamin B12    Vitamin B12   3  Myositis, unspecified myositis type, unspecified site  Vitamin D 25 hydroxy    Vitamin D 25 hydroxy   4  Sebaceous cyst           Patient Instructions     Patient Instructions   Wife will remove sutures--see labs for fatigue--parisa ear 2-3wks          Chief Complaint     Chief Complaint   Patient presents with    Procedure     chest         History of Present Illness       C/o fatigue--seb cyst        Review of Systems   Review of Systems   Constitutional: Positive for fatigue  Respiratory: Negative  Cardiovascular: Negative            Current Medications       Current Outpatient Prescriptions:     amphetamine-dextroamphetamine (ADDERALL XR) 30 MG 24 hr capsule, Take 2 capsules (60 mg total) by mouth daily Max Daily Amount: 60 mg, Disp: 60 capsule, Rfl: 0    methadone (DOLOPHINE) 10 mg tablet, , Disp: , Rfl:     omeprazole (PriLOSEC) 40 MG capsule, Take 1 capsule by mouth daily, Disp: , Rfl:     oxyCODONE (ROXICODONE) 15 mg immediate release tablet, , Disp: , Rfl:     triamcinolone (KENALOG) 0 5 % cream, Apply topically 3 (three) times a day, Disp: , Rfl:     Current Allergies     Allergies as of 2018    (No Known Allergies)            The following portions of the patient's history were reviewed and updated as appropriate: allergies, current medications, past family history, past medical history, past social history, past surgical history and problem list      Past Medical History:   Diagnosis Date    Dyschromia     Idiopathic peripheral neuropathy     Spinal stenosis        No past surgical history on file  Family History   Problem Relation Age of Onset    Diabetes Father          Medications have been verified  Objective   /78   Pulse 92   Ht 6' 2" (1 88 m)   Wt 85 3 kg (188 lb)   SpO2 98%   BMI 24 14 kg/m²          Physical Exam     Physical Exam   Constitutional: He appears well-developed and well-nourished  No distress  HENT:   Right Ear: Tympanic membrane, external ear and ear canal normal  Tympanic membrane is not injected  Left Ear: Tympanic membrane, external ear and ear canal normal  Tympanic membrane is not injected  Nose: Nose normal    Mouth/Throat: Uvula is midline, oropharynx is clear and moist and mucous membranes are normal    Eyes: Pupils are equal, round, and reactive to light  Conjunctivae are normal    Neck: Normal range of motion  Neck supple  No thyromegaly present  Cardiovascular: Normal rate, regular rhythm and normal heart sounds  No murmur heard  Pulmonary/Chest: Effort normal and breath sounds normal  No respiratory distress  He has no wheezes  Lymphadenopathy:     He has no cervical adenopathy     Skin: He is not diaphoretic    seb cyst abdom     Skin excision  Date/Time: 11/9/2018 1:33 PM  Performed by: Lisa Tidwell  Authorized by: Lisa Tidwell     Procedure Details - Skin Excision:     Number of Lesions:  1  Lesion 1:     Body area:  Trunk    Trunk location:  Abdomen       Malignancy: benign lesion      Repair type:  Linear closure    Closure complexity: simple      Repair size (cm):  1     1 suture placed

## 2018-11-17 LAB
25(OH)D3+25(OH)D2 SERPL-MCNC: 34.2 NG/ML (ref 30–100)
BASOPHILS # BLD AUTO: 0.1 X10E3/UL (ref 0–0.2)
BASOPHILS NFR BLD AUTO: 1 %
EOSINOPHIL # BLD AUTO: 0.3 X10E3/UL (ref 0–0.4)
EOSINOPHIL NFR BLD AUTO: 3 %
ERYTHROCYTE [DISTWIDTH] IN BLOOD BY AUTOMATED COUNT: 13.8 % (ref 12.3–15.4)
ERYTHROCYTE [SEDIMENTATION RATE] IN BLOOD BY WESTERGREN METHOD: 30 MM/HR (ref 0–30)
HCT VFR BLD AUTO: 36.3 % (ref 37.5–51)
HGB BLD-MCNC: 12 G/DL (ref 13–17.7)
IMM GRANULOCYTES # BLD: 0 X10E3/UL (ref 0–0.1)
IMM GRANULOCYTES NFR BLD: 0 %
LYMPHOCYTES # BLD AUTO: 2.3 X10E3/UL (ref 0.7–3.1)
LYMPHOCYTES NFR BLD AUTO: 31 %
MCH RBC QN AUTO: 29.9 PG (ref 26.6–33)
MCHC RBC AUTO-ENTMCNC: 33.1 G/DL (ref 31.5–35.7)
MCV RBC AUTO: 91 FL (ref 79–97)
MONOCYTES # BLD AUTO: 0.6 X10E3/UL (ref 0.1–0.9)
MONOCYTES NFR BLD AUTO: 8 %
NEUTROPHILS # BLD AUTO: 4.2 X10E3/UL (ref 1.4–7)
NEUTROPHILS NFR BLD AUTO: 57 %
PLATELET # BLD AUTO: 301 X10E3/UL (ref 150–379)
RBC # BLD AUTO: 4.01 X10E6/UL (ref 4.14–5.8)
TSH SERPL DL<=0.005 MIU/L-ACNC: 1.67 UIU/ML (ref 0.45–4.5)
VIT B12 SERPL-MCNC: 663 PG/ML (ref 232–1245)
WBC # BLD AUTO: 7.4 X10E3/UL (ref 3.4–10.8)

## 2018-11-21 ENCOUNTER — TELEPHONE (OUTPATIENT)
Dept: FAMILY MEDICINE CLINIC | Facility: CLINIC | Age: 53
End: 2018-11-21

## 2018-11-21 DIAGNOSIS — D64.9 ANEMIA, UNSPECIFIED TYPE: Primary | ICD-10-CM

## 2018-11-21 NOTE — TELEPHONE ENCOUNTER
Patient notified of lab results as per Dr Jamarcus Durand  He will star the Iron supplement 324mg daily and recheck level in 1-2 months -- [facility] order placed      Patient also agrees to call his GI for appointment and will call office after securing appointment for a Referral   States he sees [facility]    ----- Message from Li Layton MD sent at 11/17/2018  7:23 AM EST -----  Mild anemia---start iron--repeat 1-2mos---GI consult for scope

## 2018-12-03 ENCOUNTER — TELEPHONE (OUTPATIENT)
Dept: FAMILY MEDICINE CLINIC | Facility: CLINIC | Age: 53
End: 2018-12-03

## 2018-12-04 ENCOUNTER — TELEPHONE (OUTPATIENT)
Dept: FAMILY MEDICINE CLINIC | Facility: CLINIC | Age: 53
End: 2018-12-04

## 2018-12-04 DIAGNOSIS — F98.8 ATTENTION DEFICIT DISORDER, UNSPECIFIED HYPERACTIVITY PRESENCE: ICD-10-CM

## 2018-12-04 RX ORDER — DEXTROAMPHETAMINE SACCHARATE, AMPHETAMINE ASPARTATE MONOHYDRATE, DEXTROAMPHETAMINE SULFATE AND AMPHETAMINE SULFATE 7.5; 7.5; 7.5; 7.5 MG/1; MG/1; MG/1; MG/1
60 CAPSULE, EXTENDED RELEASE ORAL DAILY
Qty: 60 CAPSULE | Refills: 0 | Status: SHIPPED | OUTPATIENT
Start: 2018-12-04 | End: 2019-01-07 | Stop reason: SDUPTHER

## 2018-12-04 NOTE — TELEPHONE ENCOUNTER
Jaqueline Cruz [facility]  [phone]  appt 12/11/18  kyra    Referral #: [id number] Effective: 12/04/2018 Expires: 03/03/2019

## 2019-01-07 DIAGNOSIS — F98.8 ATTENTION DEFICIT DISORDER, UNSPECIFIED HYPERACTIVITY PRESENCE: ICD-10-CM

## 2019-01-07 RX ORDER — DEXTROAMPHETAMINE SACCHARATE, AMPHETAMINE ASPARTATE MONOHYDRATE, DEXTROAMPHETAMINE SULFATE AND AMPHETAMINE SULFATE 7.5; 7.5; 7.5; 7.5 MG/1; MG/1; MG/1; MG/1
60 CAPSULE, EXTENDED RELEASE ORAL DAILY
Qty: 60 CAPSULE | Refills: 0 | Status: SHIPPED | OUTPATIENT
Start: 2019-01-07 | End: 2019-02-12 | Stop reason: SDUPTHER

## 2019-01-23 ENCOUNTER — TELEPHONE (OUTPATIENT)
Dept: FAMILY MEDICINE CLINIC | Facility: CLINIC | Age: 54
End: 2019-01-23

## 2019-01-23 DIAGNOSIS — M54.5 LOW BACK PAIN, UNSPECIFIED BACK PAIN LATERALITY, UNSPECIFIED CHRONICITY, WITH SCIATICA PRESENCE UNSPECIFIED: Primary | ICD-10-CM

## 2019-01-23 NOTE — TELEPHONE ENCOUNTER
Referral Line Voicemail from 1/22/19 @ 0930    Needs referral to Dr Cyndee Barrett   for his annual check up  Appointment was to be yesterday 1/22/19 -- message gotten off VM today    Also states Dr Mirlande Luu told him to call our office for more blood work  Will need to call patient as Dr Mirlande Luu should be able to order this himself  Referral #: [id number] Effective: 01/22/2019 Expires: 04/21/2019     I spoke to patient when he called in -- he stated that he had some repeat labs at [facility] in December that were ordered by [facility] and they wanted Dr Janine Armando to review to see if anything additional needed  I told him I would try to locate those labs for review

## 2019-01-26 LAB
BASOPHILS # BLD AUTO: 0.1 X10E3/UL (ref 0–0.2)
BASOPHILS NFR BLD AUTO: 1 %
EOSINOPHIL # BLD AUTO: 0.3 X10E3/UL (ref 0–0.4)
EOSINOPHIL NFR BLD AUTO: 4 %
ERYTHROCYTE [DISTWIDTH] IN BLOOD BY AUTOMATED COUNT: 15 % (ref 12.3–15.4)
HCT VFR BLD AUTO: 39.9 % (ref 37.5–51)
HGB BLD-MCNC: 13.1 G/DL (ref 13–17.7)
IMM GRANULOCYTES # BLD: 0 X10E3/UL (ref 0–0.1)
IMM GRANULOCYTES NFR BLD: 0 %
LYMPHOCYTES # BLD AUTO: 3.2 X10E3/UL (ref 0.7–3.1)
LYMPHOCYTES NFR BLD AUTO: 42 %
MCH RBC QN AUTO: 30.8 PG (ref 26.6–33)
MCHC RBC AUTO-ENTMCNC: 32.8 G/DL (ref 31.5–35.7)
MCV RBC AUTO: 94 FL (ref 79–97)
MONOCYTES # BLD AUTO: 0.5 X10E3/UL (ref 0.1–0.9)
MONOCYTES NFR BLD AUTO: 7 %
NEUTROPHILS # BLD AUTO: 3.6 X10E3/UL (ref 1.4–7)
NEUTROPHILS NFR BLD AUTO: 46 %
PLATELET # BLD AUTO: 238 X10E3/UL (ref 150–379)
RBC # BLD AUTO: 4.26 X10E6/UL (ref 4.14–5.8)
WBC # BLD AUTO: 7.7 X10E3/UL (ref 3.4–10.8)

## 2019-01-28 ENCOUNTER — TELEPHONE (OUTPATIENT)
Dept: FAMILY MEDICINE CLINIC | Facility: CLINIC | Age: 54
End: 2019-01-28

## 2019-01-28 NOTE — TELEPHONE ENCOUNTER
----- Message from Sainya Vazquez MD sent at 1/26/2019 10:23 AM EST -----  Anemia improved---cont iron--ck pema mos none

## 2019-02-01 ENCOUNTER — TELEPHONE (OUTPATIENT)
Dept: FAMILY MEDICINE CLINIC | Facility: CLINIC | Age: 54
End: 2019-02-01

## 2019-02-01 NOTE — TELEPHONE ENCOUNTER
Referral #: [id number]  Effective: 01/21/2019  Expires: 04/20/2019    Referral [id number] has been successfully submitted     Sadie Doshi   [address] [place] [address] [phone]   PATIENT'S INSURANCE  Member ID: [id number]  PRIMARY CARE PHYSICIAN  [facility]   NPI: [id number]   From  [address] [facility] [facility]   NPI: [id number]  [address], [address]   To  [facility]NOSTIC [facility]IN [facility]  NPI: [id number]  [facility], [address] [place] [id number]

## 2019-02-12 DIAGNOSIS — F98.8 ATTENTION DEFICIT DISORDER, UNSPECIFIED HYPERACTIVITY PRESENCE: ICD-10-CM

## 2019-02-12 RX ORDER — DEXTROAMPHETAMINE SACCHARATE, AMPHETAMINE ASPARTATE MONOHYDRATE, DEXTROAMPHETAMINE SULFATE AND AMPHETAMINE SULFATE 7.5; 7.5; 7.5; 7.5 MG/1; MG/1; MG/1; MG/1
60 CAPSULE, EXTENDED RELEASE ORAL DAILY
Qty: 60 CAPSULE | Refills: 0 | Status: SHIPPED | OUTPATIENT
Start: 2019-02-12 | End: 2019-04-01 | Stop reason: SDUPTHER

## 2019-04-01 DIAGNOSIS — F98.8 ATTENTION DEFICIT DISORDER, UNSPECIFIED HYPERACTIVITY PRESENCE: ICD-10-CM

## 2019-04-01 RX ORDER — DEXTROAMPHETAMINE SACCHARATE, AMPHETAMINE ASPARTATE MONOHYDRATE, DEXTROAMPHETAMINE SULFATE AND AMPHETAMINE SULFATE 7.5; 7.5; 7.5; 7.5 MG/1; MG/1; MG/1; MG/1
60 CAPSULE, EXTENDED RELEASE ORAL DAILY
Qty: 60 CAPSULE | Refills: 0 | Status: SHIPPED | OUTPATIENT
Start: 2019-04-01 | End: 2019-05-16 | Stop reason: SDUPTHER

## 2019-05-10 ENCOUNTER — TELEPHONE (OUTPATIENT)
Dept: FAMILY MEDICINE CLINIC | Facility: CLINIC | Age: 54
End: 2019-05-10

## 2019-05-16 ENCOUNTER — TELEPHONE (OUTPATIENT)
Dept: FAMILY MEDICINE CLINIC | Facility: CLINIC | Age: 54
End: 2019-05-16

## 2019-05-16 ENCOUNTER — OFFICE VISIT (OUTPATIENT)
Dept: FAMILY MEDICINE CLINIC | Facility: CLINIC | Age: 54
End: 2019-05-16
Payer: COMMERCIAL

## 2019-05-16 VITALS
WEIGHT: 193.2 LBS | DIASTOLIC BLOOD PRESSURE: 80 MMHG | RESPIRATION RATE: 18 BRPM | TEMPERATURE: 98 F | HEART RATE: 89 BPM | BODY MASS INDEX: 28.61 KG/M2 | HEIGHT: 69 IN | OXYGEN SATURATION: 98 % | SYSTOLIC BLOOD PRESSURE: 130 MMHG

## 2019-05-16 DIAGNOSIS — F98.8 ATTENTION DEFICIT DISORDER, UNSPECIFIED HYPERACTIVITY PRESENCE: ICD-10-CM

## 2019-05-16 DIAGNOSIS — Z13.9 SCREENING FOR CONDITION: ICD-10-CM

## 2019-05-16 DIAGNOSIS — F98.8 ATTENTION DEFICIT DISORDER (ADD) WITHOUT HYPERACTIVITY: ICD-10-CM

## 2019-05-16 DIAGNOSIS — F42.4 DERMATILLOMANIA: Primary | ICD-10-CM

## 2019-05-16 DIAGNOSIS — L30.9 DERMATITIS: ICD-10-CM

## 2019-05-16 DIAGNOSIS — M48.07 SPINAL STENOSIS OF LUMBOSACRAL REGION: ICD-10-CM

## 2019-05-16 DIAGNOSIS — Z00.00 WELLNESS EXAMINATION: ICD-10-CM

## 2019-05-16 DIAGNOSIS — Z12.11 SCREEN FOR COLON CANCER: ICD-10-CM

## 2019-05-16 PROCEDURE — 3008F BODY MASS INDEX DOCD: CPT | Performed by: FAMILY MEDICINE

## 2019-05-16 PROCEDURE — 99214 OFFICE O/P EST MOD 30 MIN: CPT | Performed by: FAMILY MEDICINE

## 2019-05-16 PROCEDURE — 99396 PREV VISIT EST AGE 40-64: CPT | Performed by: FAMILY MEDICINE

## 2019-05-16 RX ORDER — DEXTROAMPHETAMINE SACCHARATE, AMPHETAMINE ASPARTATE MONOHYDRATE, DEXTROAMPHETAMINE SULFATE AND AMPHETAMINE SULFATE 7.5; 7.5; 7.5; 7.5 MG/1; MG/1; MG/1; MG/1
30 CAPSULE, EXTENDED RELEASE ORAL DAILY
Qty: 30 CAPSULE | Refills: 0 | Status: SHIPPED | OUTPATIENT
Start: 2019-05-16 | End: 2019-07-12 | Stop reason: SDUPTHER

## 2019-05-16 RX ORDER — DEXTROAMPHETAMINE SACCHARATE, AMPHETAMINE ASPARTATE, DEXTROAMPHETAMINE SULFATE AND AMPHETAMINE SULFATE 2.5; 2.5; 2.5; 2.5 MG/1; MG/1; MG/1; MG/1
10 TABLET ORAL
Qty: 30 TABLET | Refills: 0 | Status: SHIPPED | OUTPATIENT
Start: 2019-05-16 | End: 2019-07-12 | Stop reason: SDUPTHER

## 2019-05-16 RX ORDER — TRIAMCINOLONE ACETONIDE 5 MG/G
CREAM TOPICAL 3 TIMES DAILY
Qty: 60 G | Refills: 5 | Status: SHIPPED | OUTPATIENT
Start: 2019-05-16 | End: 2020-03-10 | Stop reason: ALTCHOICE

## 2019-07-12 DIAGNOSIS — F98.8 ATTENTION DEFICIT DISORDER, UNSPECIFIED HYPERACTIVITY PRESENCE: ICD-10-CM

## 2019-07-12 DIAGNOSIS — F98.8 ATTENTION DEFICIT DISORDER (ADD) WITHOUT HYPERACTIVITY: ICD-10-CM

## 2019-07-12 RX ORDER — DEXTROAMPHETAMINE SACCHARATE, AMPHETAMINE ASPARTATE MONOHYDRATE, DEXTROAMPHETAMINE SULFATE AND AMPHETAMINE SULFATE 7.5; 7.5; 7.5; 7.5 MG/1; MG/1; MG/1; MG/1
30 CAPSULE, EXTENDED RELEASE ORAL DAILY
Qty: 30 CAPSULE | Refills: 0 | Status: SHIPPED | OUTPATIENT
Start: 2019-07-12 | End: 2019-08-19 | Stop reason: SDUPTHER

## 2019-07-12 RX ORDER — DEXTROAMPHETAMINE SACCHARATE, AMPHETAMINE ASPARTATE, DEXTROAMPHETAMINE SULFATE AND AMPHETAMINE SULFATE 2.5; 2.5; 2.5; 2.5 MG/1; MG/1; MG/1; MG/1
10 TABLET ORAL
Qty: 30 TABLET | Refills: 0 | Status: SHIPPED | OUTPATIENT
Start: 2019-07-12 | End: 2019-08-19 | Stop reason: SDUPTHER

## 2019-08-07 ENCOUNTER — TELEPHONE (OUTPATIENT)
Dept: FAMILY MEDICINE CLINIC | Facility: CLINIC | Age: 54
End: 2019-08-07

## 2019-08-19 DIAGNOSIS — F98.8 ATTENTION DEFICIT DISORDER, UNSPECIFIED HYPERACTIVITY PRESENCE: ICD-10-CM

## 2019-08-19 DIAGNOSIS — F98.8 ATTENTION DEFICIT DISORDER (ADD) WITHOUT HYPERACTIVITY: ICD-10-CM

## 2019-08-20 RX ORDER — DEXTROAMPHETAMINE SACCHARATE, AMPHETAMINE ASPARTATE, DEXTROAMPHETAMINE SULFATE AND AMPHETAMINE SULFATE 2.5; 2.5; 2.5; 2.5 MG/1; MG/1; MG/1; MG/1
10 TABLET ORAL
Qty: 30 TABLET | Refills: 0 | Status: SHIPPED | OUTPATIENT
Start: 2019-08-20 | End: 2019-09-20 | Stop reason: SDUPTHER

## 2019-08-20 RX ORDER — DEXTROAMPHETAMINE SACCHARATE, AMPHETAMINE ASPARTATE MONOHYDRATE, DEXTROAMPHETAMINE SULFATE AND AMPHETAMINE SULFATE 7.5; 7.5; 7.5; 7.5 MG/1; MG/1; MG/1; MG/1
30 CAPSULE, EXTENDED RELEASE ORAL DAILY
Qty: 30 CAPSULE | Refills: 0 | Status: SHIPPED | OUTPATIENT
Start: 2019-08-20 | End: 2019-09-20 | Stop reason: SDUPTHER

## 2019-09-20 DIAGNOSIS — F98.8 ATTENTION DEFICIT DISORDER, UNSPECIFIED HYPERACTIVITY PRESENCE: ICD-10-CM

## 2019-09-20 DIAGNOSIS — F98.8 ATTENTION DEFICIT DISORDER (ADD) WITHOUT HYPERACTIVITY: ICD-10-CM

## 2019-09-21 RX ORDER — DEXTROAMPHETAMINE SACCHARATE, AMPHETAMINE ASPARTATE, DEXTROAMPHETAMINE SULFATE AND AMPHETAMINE SULFATE 2.5; 2.5; 2.5; 2.5 MG/1; MG/1; MG/1; MG/1
10 TABLET ORAL
Qty: 30 TABLET | Refills: 0 | Status: SHIPPED | OUTPATIENT
Start: 2019-09-21 | End: 2019-10-18 | Stop reason: SDUPTHER

## 2019-09-21 RX ORDER — DEXTROAMPHETAMINE SACCHARATE, AMPHETAMINE ASPARTATE MONOHYDRATE, DEXTROAMPHETAMINE SULFATE AND AMPHETAMINE SULFATE 7.5; 7.5; 7.5; 7.5 MG/1; MG/1; MG/1; MG/1
30 CAPSULE, EXTENDED RELEASE ORAL DAILY
Qty: 30 CAPSULE | Refills: 0 | Status: SHIPPED | OUTPATIENT
Start: 2019-09-21 | End: 2019-10-18 | Stop reason: SDUPTHER

## 2019-10-18 DIAGNOSIS — F98.8 ATTENTION DEFICIT DISORDER, UNSPECIFIED HYPERACTIVITY PRESENCE: ICD-10-CM

## 2019-10-18 DIAGNOSIS — F98.8 ATTENTION DEFICIT DISORDER (ADD) WITHOUT HYPERACTIVITY: ICD-10-CM

## 2019-10-19 RX ORDER — DEXTROAMPHETAMINE SACCHARATE, AMPHETAMINE ASPARTATE, DEXTROAMPHETAMINE SULFATE AND AMPHETAMINE SULFATE 2.5; 2.5; 2.5; 2.5 MG/1; MG/1; MG/1; MG/1
10 TABLET ORAL
Qty: 30 TABLET | Refills: 0 | Status: SHIPPED | OUTPATIENT
Start: 2019-10-20 | End: 2019-11-26 | Stop reason: SDUPTHER

## 2019-10-19 RX ORDER — DEXTROAMPHETAMINE SACCHARATE, AMPHETAMINE ASPARTATE MONOHYDRATE, DEXTROAMPHETAMINE SULFATE AND AMPHETAMINE SULFATE 7.5; 7.5; 7.5; 7.5 MG/1; MG/1; MG/1; MG/1
30 CAPSULE, EXTENDED RELEASE ORAL DAILY
Qty: 30 CAPSULE | Refills: 0 | Status: SHIPPED | OUTPATIENT
Start: 2019-10-20 | End: 2019-11-26 | Stop reason: SDUPTHER

## 2019-11-19 ENCOUNTER — TELEPHONE (OUTPATIENT)
Dept: FAMILY MEDICINE CLINIC | Facility: CLINIC | Age: 54
End: 2019-11-19

## 2019-11-21 NOTE — TELEPHONE ENCOUNTER
Valid  Referral #: [id number]  Effective: 11/21/2019  Expires: 02/18/2020    Referral [id number] has been successfully submitted

## 2019-11-26 ENCOUNTER — OFFICE VISIT (OUTPATIENT)
Dept: FAMILY MEDICINE CLINIC | Facility: CLINIC | Age: 54
End: 2019-11-26
Payer: COMMERCIAL

## 2019-11-26 VITALS
HEART RATE: 76 BPM | SYSTOLIC BLOOD PRESSURE: 122 MMHG | HEIGHT: 74 IN | DIASTOLIC BLOOD PRESSURE: 70 MMHG | WEIGHT: 192 LBS | RESPIRATION RATE: 16 BRPM | BODY MASS INDEX: 24.64 KG/M2

## 2019-11-26 DIAGNOSIS — M48.07 SPINAL STENOSIS OF LUMBOSACRAL REGION: Primary | ICD-10-CM

## 2019-11-26 DIAGNOSIS — F98.8 ATTENTION DEFICIT DISORDER (ADD) WITHOUT HYPERACTIVITY: ICD-10-CM

## 2019-11-26 DIAGNOSIS — F98.8 ATTENTION DEFICIT DISORDER, UNSPECIFIED HYPERACTIVITY PRESENCE: ICD-10-CM

## 2019-11-26 PROCEDURE — 99214 OFFICE O/P EST MOD 30 MIN: CPT | Performed by: FAMILY MEDICINE

## 2019-11-26 RX ORDER — DEXTROAMPHETAMINE SACCHARATE, AMPHETAMINE ASPARTATE, DEXTROAMPHETAMINE SULFATE AND AMPHETAMINE SULFATE 2.5; 2.5; 2.5; 2.5 MG/1; MG/1; MG/1; MG/1
10 TABLET ORAL
Qty: 30 TABLET | Refills: 0 | Status: SHIPPED | OUTPATIENT
Start: 2019-11-26 | End: 2019-12-31 | Stop reason: SDUPTHER

## 2019-11-26 RX ORDER — DEXTROAMPHETAMINE SACCHARATE, AMPHETAMINE ASPARTATE MONOHYDRATE, DEXTROAMPHETAMINE SULFATE AND AMPHETAMINE SULFATE 7.5; 7.5; 7.5; 7.5 MG/1; MG/1; MG/1; MG/1
30 CAPSULE, EXTENDED RELEASE ORAL DAILY
Qty: 30 CAPSULE | Refills: 0 | Status: SHIPPED | OUTPATIENT
Start: 2019-11-26 | End: 2019-12-31 | Stop reason: SDUPTHER

## 2019-11-26 NOTE — PROGRESS NOTES
[facility]        NAME: Enoc Vasques is a 47 y o  male  : 1965    MRN: [id number]  DATE: 2019  TIME: 8:22 AM    Assessment and Plan   Spinal stenosis of lumbosacral region [M48 07]  1  Spinal stenosis of lumbosacral region     2  Attention deficit disorder (ADD) without hyperactivity  amphetamine-dextroamphetamine (ADDERALL) 10 mg tablet   3  Attention deficit disorder, unspecified hyperactivity presence  amphetamine-dextroamphetamine (ADDERALL XR) 30 MG 24 hr capsule         Patient Instructions     Patient Instructions   Same meds          Chief Complaint     Chief Complaint   Patient presents with    Follow-up     MED CHECK- ADDERALL         History of Present Illness       F/u assessed med cond--stable      Review of Systems   Review of Systems   Constitutional: Negative for fatigue, fever and unexpected weight change  HENT: Negative for congestion, sinus pain and sore throat  Eyes: Negative for visual disturbance  Respiratory: Negative for shortness of breath and wheezing  Cardiovascular: Negative for chest pain and palpitations  Gastrointestinal: Negative for abdominal pain, nausea and vomiting  Musculoskeletal: Negative  Negative for arthralgias and myalgias  Neurological: Negative for syncope, weakness and numbness  Psychiatric/Behavioral: Negative  Negative for confusion, dysphoric mood and suicidal ideas           Current Medications       Current Outpatient Medications:     amphetamine-dextroamphetamine (ADDERALL XR) 30 MG 24 hr capsule, Take 1 capsule (30 mg total) by mouth dailyMax Daily Amount: 30 mg, Disp: 30 capsule, Rfl: 0    amphetamine-dextroamphetamine (ADDERALL) 10 mg tablet, Take 1 tablet (10 mg total) by mouth 2 (two) times a dayMax Daily Amount: 20 mg, Disp: 30 tablet, Rfl: 0    methadone (DOLOPHINE) 10 mg tablet, , Disp: , Rfl:     oxyCODONE (ROXICODONE) 15 mg immediate release tablet, , Disp: , Rfl:     omeprazole (PriLOSEC) 40 MG capsule, Take 1 capsule by mouth daily, Disp: , Rfl:     triamcinolone (KENALOG) 0 5 % cream, Apply topically 3 (three) times a day (Patient not taking: Reported on 11/26/2019), Disp: 60 g, Rfl: 5    Current Allergies     Allergies as of 11/26/2019    (No Known Allergies)            The following portions of the patient's history were reviewed and updated as appropriate: allergies, current medications, past family history, past medical history, past social history, past surgical history and problem list      Past Medical History:   Diagnosis Date    Dyschromia     Idiopathic peripheral neuropathy     Spinal stenosis        History reviewed  No pertinent surgical history  Family History   Problem Relation Age of Onset    Diabetes Father          Medications have been verified  Objective   /70   Pulse 76   Resp 16   Ht 6' 2" (1 88 m)   Wt 87 1 kg (192 lb)   BMI 24 65 kg/m²        Physical Exam     Physical Exam   Constitutional: He is oriented to person, place, and time  Vital signs are normal  He appears well-developed and well-nourished  HENT:   Right Ear: Ear canal normal  Tympanic membrane is not injected  Left Ear: Ear canal normal  Tympanic membrane is not injected  Nose: Nose normal    Mouth/Throat: Oropharynx is clear and moist    Eyes: Pupils are equal, round, and reactive to light  Conjunctivae and EOM are normal  Right eye exhibits no discharge  Left eye exhibits no discharge  Neck: Normal range of motion  Neck supple  No thyromegaly present  Cardiovascular: Normal rate, regular rhythm and normal heart sounds  No murmur heard  Pulmonary/Chest: Effort normal and breath sounds normal  No respiratory distress  He has no wheezes  Abdominal: Soft  Bowel sounds are normal  He exhibits no distension  There is no tenderness  Musculoskeletal: Normal range of motion  Lymphadenopathy:     He has no cervical adenopathy     Neurological: He is alert and oriented to person, place, and time  He has normal strength and normal reflexes  He is not disoriented  No sensory deficit  Gait normal    Skin: Skin is warm and dry  Psychiatric: He has a normal mood and affect   His speech is normal and behavior is normal  Judgment and thought content normal  Cognition and memory are normal

## 2019-12-31 DIAGNOSIS — F98.8 ATTENTION DEFICIT DISORDER, UNSPECIFIED HYPERACTIVITY PRESENCE: ICD-10-CM

## 2019-12-31 DIAGNOSIS — F98.8 ATTENTION DEFICIT DISORDER (ADD) WITHOUT HYPERACTIVITY: ICD-10-CM

## 2019-12-31 RX ORDER — DEXTROAMPHETAMINE SACCHARATE, AMPHETAMINE ASPARTATE MONOHYDRATE, DEXTROAMPHETAMINE SULFATE AND AMPHETAMINE SULFATE 7.5; 7.5; 7.5; 7.5 MG/1; MG/1; MG/1; MG/1
30 CAPSULE, EXTENDED RELEASE ORAL DAILY
Qty: 30 CAPSULE | Refills: 0 | Status: SHIPPED | OUTPATIENT
Start: 2019-12-31 | End: 2020-02-20 | Stop reason: SDUPTHER

## 2019-12-31 RX ORDER — DEXTROAMPHETAMINE SACCHARATE, AMPHETAMINE ASPARTATE, DEXTROAMPHETAMINE SULFATE AND AMPHETAMINE SULFATE 2.5; 2.5; 2.5; 2.5 MG/1; MG/1; MG/1; MG/1
10 TABLET ORAL
Qty: 30 TABLET | Refills: 0 | Status: SHIPPED | OUTPATIENT
Start: 2019-12-31 | End: 2020-02-20 | Stop reason: SDUPTHER

## 2020-02-20 DIAGNOSIS — F98.8 ATTENTION DEFICIT DISORDER, UNSPECIFIED HYPERACTIVITY PRESENCE: ICD-10-CM

## 2020-02-20 DIAGNOSIS — F98.8 ATTENTION DEFICIT DISORDER (ADD) WITHOUT HYPERACTIVITY: ICD-10-CM

## 2020-02-21 RX ORDER — DEXTROAMPHETAMINE SACCHARATE, AMPHETAMINE ASPARTATE MONOHYDRATE, DEXTROAMPHETAMINE SULFATE AND AMPHETAMINE SULFATE 7.5; 7.5; 7.5; 7.5 MG/1; MG/1; MG/1; MG/1
30 CAPSULE, EXTENDED RELEASE ORAL DAILY
Qty: 30 CAPSULE | Refills: 0 | Status: SHIPPED | OUTPATIENT
Start: 2020-02-21 | End: 2020-05-13

## 2020-02-21 RX ORDER — DEXTROAMPHETAMINE SACCHARATE, AMPHETAMINE ASPARTATE, DEXTROAMPHETAMINE SULFATE AND AMPHETAMINE SULFATE 2.5; 2.5; 2.5; 2.5 MG/1; MG/1; MG/1; MG/1
10 TABLET ORAL
Qty: 30 TABLET | Refills: 0 | Status: SHIPPED | OUTPATIENT
Start: 2020-02-21 | End: 2020-05-13

## 2020-03-10 ENCOUNTER — OFFICE VISIT (OUTPATIENT)
Dept: FAMILY MEDICINE CLINIC | Facility: CLINIC | Age: 55
End: 2020-03-10
Payer: COMMERCIAL

## 2020-03-10 VITALS
RESPIRATION RATE: 20 BRPM | TEMPERATURE: 97.2 F | DIASTOLIC BLOOD PRESSURE: 80 MMHG | OXYGEN SATURATION: 98 % | SYSTOLIC BLOOD PRESSURE: 136 MMHG | BODY MASS INDEX: 27.28 KG/M2 | WEIGHT: 184.2 LBS | HEIGHT: 69 IN | HEART RATE: 89 BPM

## 2020-03-10 DIAGNOSIS — M48.07 SPINAL STENOSIS OF LUMBOSACRAL REGION: Primary | ICD-10-CM

## 2020-03-10 DIAGNOSIS — F41.9 ANXIETY: ICD-10-CM

## 2020-03-10 DIAGNOSIS — Z12.12 ENCOUNTER FOR SCREENING FOR MALIGNANT NEOPLASM OF RECTUM: ICD-10-CM

## 2020-03-10 DIAGNOSIS — Z12.11 ENCOUNTER FOR SCREENING FOR MALIGNANT NEOPLASM OF COLON: Primary | ICD-10-CM

## 2020-03-10 PROCEDURE — 3008F BODY MASS INDEX DOCD: CPT | Performed by: FAMILY MEDICINE

## 2020-03-10 PROCEDURE — 99214 OFFICE O/P EST MOD 30 MIN: CPT | Performed by: FAMILY MEDICINE

## 2020-03-10 RX ORDER — DICLOFENAC SODIUM 75 MG/1
75 TABLET, DELAYED RELEASE ORAL 2 TIMES DAILY
Qty: 60 TABLET | Refills: 5 | Status: SHIPPED | OUTPATIENT
Start: 2020-03-10 | End: 2020-08-27

## 2020-03-10 RX ORDER — LORAZEPAM 1 MG/1
1 TABLET ORAL EVERY 8 HOURS PRN
Qty: 90 TABLET | Refills: 3 | Status: SHIPPED | OUTPATIENT
Start: 2020-03-10 | End: 2020-08-27

## 2020-03-10 NOTE — PROGRESS NOTES
[facility]        NAME: Wilton Blanc is a 49 y o  male  : 1965    MRN: [id number]  DATE: March 10, 2020  TIME: 8:37 AM    Assessment and Plan   Spinal stenosis of lumbosacral region [M48 07]  1  Spinal stenosis of lumbosacral region  diclofenac (VOLTAREN) 75 mg EC tablet   2  Anxiety  LORazepam (ATIVAN) 1 mg tablet         Patient Instructions     Patient Instructions   Pt wants to stop opioids--on for 12yrs thru pain MD---some withdrawal--trial ativan--refer Alexis Found          Chief Complaint     Chief Complaint   Patient presents with    patient presents c/o a mole     located on L/upper chest area, not painful    withdrawal symptoms     patient states that he is been having withdrawal symptoms since he stop taking methadone 10 mg, not able to sleep         History of Present Illness       F/u opioid withdrawal---stopped pain meds      Review of Systems   Review of Systems   Constitutional: Negative for fatigue, fever and unexpected weight change  HENT: Negative for congestion, sinus pain and sore throat  Eyes: Negative for visual disturbance  Respiratory: Negative for shortness of breath and wheezing  Cardiovascular: Negative for chest pain and palpitations  Gastrointestinal: Negative for abdominal pain, nausea and vomiting  Musculoskeletal: Negative  Negative for arthralgias and myalgias  Neurological: Negative for syncope, weakness and numbness  Psychiatric/Behavioral: Negative  Negative for confusion, dysphoric mood and suicidal ideas           Current Medications       Current Outpatient Medications:     amphetamine-dextroamphetamine (ADDERALL XR) 30 MG 24 hr capsule, Take 1 capsule (30 mg total) by mouth dailyMax Daily Amount: 30 mg, Disp: 30 capsule, Rfl: 0    amphetamine-dextroamphetamine (ADDERALL) 10 mg tablet, Take 1 tablet (10 mg total) by mouth 2 (two) times a dayMax Daily Amount: 20 mg, Disp: 30 tablet, Rfl: 0    diclofenac (VOLTAREN) 75 mg EC tablet, Take 1 tablet (75 mg total) by mouth 2 (two) times a day, Disp: 60 tablet, Rfl: 5    LORazepam (ATIVAN) 1 mg tablet, Take 1 tablet (1 mg total) by mouth every 8 (eight) hours as needed for anxiety, Disp: 90 tablet, Rfl: 3    Current Allergies     Allergies as of 03/10/2020    (No Known Allergies)            The following portions of the patient's history were reviewed and updated as appropriate: allergies, current medications, past family history, past medical history, past social history, past surgical history and problem list      Past Medical History:   Diagnosis Date    Dyschromia     Idiopathic peripheral neuropathy     Spinal stenosis        Past Surgical History:   Procedure Laterality Date    NO PAST SURGERIES         Family History   Problem Relation Age of Onset    Diabetes Father          Medications have been verified  Objective   /80 (BP Location: Left arm, Patient Position: Sitting, Cuff Size: Large)   Pulse 89   Temp (!) 97 2 °F (36 2 °C) (Tympanic)   Resp 20   Ht 5' 9" (1 753 m)   Wt 83 6 kg (184 lb 3 2 oz)   SpO2 98%   BMI 27 20 kg/m²        Physical Exam     Physical Exam   Constitutional: He is oriented to person, place, and time  Vital signs are normal  He appears well-developed and well-nourished  HENT:   Right Ear: Ear canal normal  Tympanic membrane is not injected  Left Ear: Ear canal normal  Tympanic membrane is not injected  Nose: Nose normal    Mouth/Throat: Oropharynx is clear and moist    Eyes: Pupils are equal, round, and reactive to light  Conjunctivae and EOM are normal  Right eye exhibits no discharge  Left eye exhibits no discharge  Neck: Normal range of motion  Neck supple  No thyromegaly present  Cardiovascular: Normal rate, regular rhythm and normal heart sounds  No murmur heard  Pulmonary/Chest: Effort normal and breath sounds normal  No respiratory distress  He has no wheezes  Abdominal: Soft   Bowel sounds are normal  He exhibits no distension  There is no tenderness  Musculoskeletal: Normal range of motion  Lymphadenopathy:     He has no cervical adenopathy  Neurological: He is alert and oriented to person, place, and time  He has normal strength and normal reflexes  He is not disoriented  No sensory deficit  Gait normal    Skin: Skin is warm and dry  Psychiatric: He has a normal mood and affect  His speech is normal and behavior is normal  Judgment and thought content normal  Cognition and memory are normal        BMI Counseling: Body mass index is 27 2 kg/m²  The BMI is above normal  Nutrition recommendations include reducing portion sizes

## 2020-03-10 NOTE — PATIENT INSTRUCTIONS
Pt wants to stop opioids--on for 12yrs thru pain MD---some withdrawal--trial ativan--refer Alexis Saldaña

## 2020-03-25 ENCOUNTER — TELEPHONE (OUTPATIENT)
Dept: FAMILY MEDICINE CLINIC | Facility: CLINIC | Age: 55
End: 2020-03-25

## 2020-03-25 NOTE — TELEPHONE ENCOUNTER
ANAI---PC--TREATMENT facility called to let us know pt was admitted to center form alcohol abuse-- and will be faxing info

## 2020-05-13 ENCOUNTER — TELEMEDICINE (OUTPATIENT)
Dept: FAMILY MEDICINE CLINIC | Facility: CLINIC | Age: 55
End: 2020-05-13
Payer: COMMERCIAL

## 2020-05-13 DIAGNOSIS — F41.9 ANXIETY: Primary | ICD-10-CM

## 2020-05-13 PROCEDURE — 99213 OFFICE O/P EST LOW 20 MIN: CPT | Performed by: FAMILY MEDICINE

## 2020-06-12 ENCOUNTER — TELEPHONE (OUTPATIENT)
Dept: GASTROENTEROLOGY | Facility: CLINIC | Age: 55
End: 2020-06-12

## 2020-06-16 ENCOUNTER — TELEPHONE (OUTPATIENT)
Dept: FAMILY MEDICINE CLINIC | Facility: CLINIC | Age: 55
End: 2020-06-16

## 2020-07-02 ENCOUNTER — CLINICAL SUPPORT (OUTPATIENT)
Dept: GASTROENTEROLOGY | Facility: CLINIC | Age: 55
End: 2020-07-02

## 2020-07-02 VITALS — BODY MASS INDEX: 24.38 KG/M2 | HEIGHT: 74 IN | WEIGHT: 190 LBS

## 2020-07-02 DIAGNOSIS — K21.9 GASTROESOPHAGEAL REFLUX DISEASE, ESOPHAGITIS PRESENCE NOT SPECIFIED: Primary | ICD-10-CM

## 2020-07-02 NOTE — PROGRESS NOTES
Phone prep for patient for EGD  Medications and allergies were reviewed  COVID instructions were given   EGD instructions were given

## 2020-07-06 DIAGNOSIS — Z11.59 SCREENING FOR VIRAL DISEASE: ICD-10-CM

## 2020-07-06 PROCEDURE — U0003 INFECTIOUS AGENT DETECTION BY NUCLEIC ACID (DNA OR RNA); SEVERE ACUTE RESPIRATORY SYNDROME CORONAVIRUS 2 (SARS-COV-2) (CORONAVIRUS DISEASE [COVID-19]), AMPLIFIED PROBE TECHNIQUE, MAKING USE OF HIGH THROUGHPUT TECHNOLOGIES AS DESCRIBED BY CMS-2020-01-R: HCPCS

## 2020-07-08 LAB
INPATIENT: NORMAL
SARS-COV-2 RNA SPEC QL NAA+PROBE: NOT DETECTED

## 2020-07-10 ENCOUNTER — HOSPITAL ENCOUNTER (OUTPATIENT)
Dept: GASTROENTEROLOGY | Facility: AMBULATORY SURGERY CENTER | Age: 55
Discharge: HOME | End: 2020-07-10
Payer: COMMERCIAL

## 2020-07-10 ENCOUNTER — ANESTHESIA EVENT (OUTPATIENT)
Dept: GASTROENTEROLOGY | Facility: AMBULATORY SURGERY CENTER | Age: 55
End: 2020-07-10

## 2020-07-10 ENCOUNTER — ANESTHESIA (OUTPATIENT)
Dept: GASTROENTEROLOGY | Facility: AMBULATORY SURGERY CENTER | Age: 55
End: 2020-07-10

## 2020-07-10 VITALS
TEMPERATURE: 97.9 F | SYSTOLIC BLOOD PRESSURE: 109 MMHG | RESPIRATION RATE: 16 BRPM | OXYGEN SATURATION: 96 % | DIASTOLIC BLOOD PRESSURE: 68 MMHG | HEART RATE: 64 BPM

## 2020-07-10 DIAGNOSIS — K22.70 BARRETT'S ESOPHAGUS WITHOUT DYSPLASIA: ICD-10-CM

## 2020-07-10 PROCEDURE — 88305 TISSUE EXAM BY PATHOLOGIST: CPT | Performed by: PATHOLOGY

## 2020-07-10 PROCEDURE — 43239 EGD BIOPSY SINGLE/MULTIPLE: CPT | Performed by: INTERNAL MEDICINE

## 2020-07-10 RX ORDER — PANTOPRAZOLE SODIUM 40 MG/1
40 TABLET, DELAYED RELEASE ORAL DAILY
Qty: 30 TABLET | Refills: 2 | Status: SHIPPED | OUTPATIENT
Start: 2020-07-10 | End: 2020-08-27 | Stop reason: SDUPTHER

## 2020-07-10 RX ORDER — PANTOPRAZOLE SODIUM 40 MG/1
40 TABLET, DELAYED RELEASE ORAL DAILY
Qty: 30 TABLET | Refills: 2 | Status: SHIPPED | OUTPATIENT
Start: 2020-07-10 | End: 2020-07-10 | Stop reason: SDUPTHER

## 2020-07-10 RX ORDER — SODIUM CHLORIDE 9 MG/ML
50 INJECTION, SOLUTION INTRAVENOUS CONTINUOUS
Status: DISCONTINUED | OUTPATIENT
Start: 2020-07-10 | End: 2020-07-10

## 2020-07-10 RX ORDER — PROPOFOL 10 MG/ML
INJECTION, EMULSION INTRAVENOUS AS NEEDED
Status: DISCONTINUED | OUTPATIENT
Start: 2020-07-10 | End: 2020-07-10 | Stop reason: SURG

## 2020-07-10 RX ADMIN — PROPOFOL 50 MG: 10 INJECTION, EMULSION INTRAVENOUS at 08:01

## 2020-07-10 RX ADMIN — PROPOFOL 100 MG: 10 INJECTION, EMULSION INTRAVENOUS at 07:58

## 2020-07-10 RX ADMIN — SODIUM CHLORIDE 50 ML/HR: 9 INJECTION, SOLUTION INTRAVENOUS at 07:45

## 2020-07-10 NOTE — DISCHARGE INSTRUCTIONS
Vickers Esophagus   WHAT YOU NEED TO KNOW:   What is Vickers esophagus? Vickers esophagus is a condition in which the cells that line your esophagus are damaged  The damage can cause abnormal changes in the cells  These abnormal changes increase your risk of esophageal cancer  What increases my risk for Vickers esophagus? The exact cause of Vickers esophagus is not known  The following may increase your risk:  · Long-term gastroesophageal reflux disease (GERD), a condition that causes stomach acid to back up into the esophagus    · Age older than 48    · Family history of GERD, Vickers esophagus, or esophageal cancer    · Obesity    · Smoking  What are the signs and symptoms of Vickers esophagus? Signs and symptoms of Vickers esophagus are usually related to the signs and symptoms of GERD  You may also have any of the following:  · Heartburn    · Difficult or painful swallowing    · Bitter or acid taste in your mouth    · Cough    · Frequent burping or hiccups    · Vomiting blood or having black, tarry bowel movements    · Weight loss without trying  How is Vickers esophagus diagnosed? · An endoscopy  is a procedure in which a scope is used to see the inside of your esophagus and stomach  A scope is a long, bendable tube with a light on the end of it  A camera may be hooked to the scope  A biopsy may also be done  A biopsy is when your healthcare provider takes tissue samples from your esophagus and sends it to a lab for tests  These tests will show if the cells of your esophagus have dysplasia (abnormal changes in your cells and tissues)  · The grade  of dysplasia is the amount of abnormal change that is present in your esophagus  The grade can range from negative (no dysplasia) to high-grade (a large amount of dysplasia)  There is a higher risk of cancer with high-grade dysplasia  How is Vickers esophagus treated? Treatment depends on the grade of dysplasia   The goal of treatment is to control your symptoms and prevent esophageal cancer  Your healthcare provider may also suggest that you make changes in the foods you eat and in your lifestyle  You may need any of the following:  · Anti-reflux medicines  help decrease the stomach acid that can irritate your esophagus and stomach  These medicines may include proton pump inhibitors (PPI) and histamine type-2 receptor (H2) blockers  You may also be given medicines to stop vomiting  · Surgery or other procedures  may be done if you have high-grade dysplasia  ¨ Fundoplication  is a surgery that wraps the upper part of your stomach around the esophageal sphincter to strengthen it  The esophageal sphincter is the muscle at the lower end of the esophagus, right above the stomach  This may help to prevent reflux  ¨ Resection or esophagectomy  is surgery to remove a part of or the entire esophagus  ¨ Ablation  is procedure that kills abnormal cells with heat or by freezing them  ¨ Photodynamic therapy  is a procedure that uses a special type of light to kill abnormal cells  It is used in combination with medicines that make the abnormal cells sensitive to light  When should I contact my healthcare provider? · Your symptoms do not improve with treatment  · You have questions or concerns about your condition or care  When should I seek immediate care or call 911? · You have severe chest pain and shortness of breath  · Your bowel movements are black, bloody, or tarry  · Your vomit looks like coffee grounds or has blood in it  CARE AGREEMENT:   You have the right to help plan your care  Learn about your health condition and how it may be treated  Discuss treatment options with your caregivers to decide what care you want to receive  You always have the right to refuse treatment  The above information is an  only  It is not intended as medical advice for individual conditions or treatments   Talk to your doctor, nurse or pharmacist before following any medical regimen to see if it is safe and effective for you  © 2017 [employer] Information is for End User's use only and may not be sold, redistributed or otherwise used for commercial purposes  All illustrations and images included in CareNotes® are the copyrighted property of A D A M , Inc  or [employer]

## 2020-07-10 NOTE — H&P
History and Physical -  [facility]  Jozef Butler 49 y o  male MRN: [id number]                  HPI: Jozef Butler is a 49y o  year old male who presents for surveillance of Barretts esophagus  REVIEW OF SYSTEMS: Per the HPI, and otherwise unremarkable  Historical Information   Past Medical History:   Diagnosis Date    Dyschromia     pt denies    GERD (gastroesophageal reflux disease)     Idiopathic peripheral neuropathy     Spinal stenosis      Past Surgical History:   Procedure Laterality Date    COLONOSCOPY      NO PAST SURGERIES       Social History   Social History     Substance and Sexual Activity   Alcohol Use No     Social History     Substance and Sexual Activity   Drug Use No     Social History     Tobacco Use   Smoking Status Current Every Day Smoker    Packs/day: 1 00    Types: Cigarettes   Smokeless Tobacco Never Used   Tobacco Comment    Smokes 1/2 pack of cigarettes per day     Family History   Problem Relation Age of Onset    Diabetes Father        Meds/Allergies     Current Outpatient Medications:     diclofenac (VOLTAREN) 75 mg EC tablet    LORazepam (ATIVAN) 1 mg tablet    Current Facility-Administered Medications:     sodium chloride 0 9 % infusion, 50 mL/hr, Intravenous, Continuous    No Known Allergies    Objective     /63   Pulse 63   Temp 97 9 °F (36 6 °C) (Temporal)   Resp 20   SpO2 98%       PHYSICAL EXAM    Gen: NAD AAOx3  CV: S1S2 RRR no m/r/g  CHEST: Clear b/l no c/r/w  ABD: +BS soft, NT/ND  EXT: no edema      ASSESSMENT/PLAN:  This is a 49y o  year old male here for EGD, and he is stable and optimized for his procedure

## 2020-07-10 NOTE — ANESTHESIA PREPROCEDURE EVALUATION
Review of Systems/Medical History  Patient summary reviewed        Cardiovascular  Negative cardio ROS    Pulmonary  Smoker cigarette smoker  ,        GI/Hepatic            Endo/Other     GYN       Hematology   Musculoskeletal       Neurology      Comment: neuropathy Psychology           Physical Exam    Airway    Mallampati score: II  TM Distance: >3 FB  Neck ROM: full     Dental   No notable dental hx     Cardiovascular  Comment: Negative ROS, Rhythm: regular, Rate: normal, Cardiovascular exam normal    Pulmonary  Pulmonary exam normal     Other Findings        Anesthesia Plan  ASA Score- 2     Anesthesia Type- IV sedation with anesthesia with ASA Monitors  Additional Monitors:   Airway Plan:         Plan Factors-    Induction- intravenous  Postoperative Plan-     Informed Consent- Anesthetic plan and risks discussed with patient

## 2020-08-27 ENCOUNTER — TELEMEDICINE (OUTPATIENT)
Dept: GASTROENTEROLOGY | Facility: CLINIC | Age: 55
End: 2020-08-27
Payer: COMMERCIAL

## 2020-08-27 VITALS — WEIGHT: 190 LBS | BODY MASS INDEX: 24.38 KG/M2 | HEIGHT: 74 IN

## 2020-08-27 DIAGNOSIS — K22.70 BARRETT'S ESOPHAGUS WITHOUT DYSPLASIA: Primary | ICD-10-CM

## 2020-08-27 DIAGNOSIS — Z86.010 HISTORY OF COLON POLYPS: ICD-10-CM

## 2020-08-27 PROCEDURE — 99213 OFFICE O/P EST LOW 20 MIN: CPT | Performed by: INTERNAL MEDICINE

## 2020-08-27 PROCEDURE — 3008F BODY MASS INDEX DOCD: CPT | Performed by: INTERNAL MEDICINE

## 2020-08-27 RX ORDER — PANTOPRAZOLE SODIUM 40 MG/1
40 TABLET, DELAYED RELEASE ORAL DAILY
Qty: 30 TABLET | Refills: 11 | Status: SHIPPED | OUTPATIENT
Start: 2020-08-27

## 2020-08-27 NOTE — LETTER
August 27, 2020     Corene Kussmaul, MD  [facility]    Patient: Jil David   YOB: 1965   Date of Visit: 8/27/2020       Dear Dr Maru Spann: Thank you for referring Jil David to me for evaluation  Below are my notes for this consultation  If you have questions, please do not hesitate to call me  I look forward to following your patient along with you  Sincerely,        Didier Clemens MD        CC: No Recipients  Didier Clemens MD  8/27/2020  1:48 PM  Sign when Signing Visit    Virtual Regular Visit      Assessment/Plan:    Problem List Items Addressed This Visit        Digestive    Vickers esophagus - Primary    Relevant Medications    pantoprazole (PROTONIX) 40 mg tablet       Other    History of colon polyps               Reason for visit is   Chief Complaint   Patient presents with    Follow up to EGD    Virtual Regular Visit        Encounter provider Didier Clemens MD    Provider located at [address] [address] [phone]      Recent Visits  No visits were found meeting these conditions  Showing recent visits within past 7 days and meeting all other requirements     Today's Visits  Date Type Provider Dept   08/27/20 Telemedicine Didier Clemens MD [facility]   Showing today's visits and meeting all other requirements     Future Appointments  No visits were found meeting these conditions  Showing future appointments within next 150 days and meeting all other requirements        The patient was identified by name and date of birth  Jil David was informed that this is a telemedicine visit and that the visit is being conducted through [employer] and patient was informed that this is not a secure, HIPAA-complaint platform  He agrees to proceed     My office door was closed  No one else was in the room    He acknowledged consent and understanding of privacy and security of the video platform  The patient has agreed to participate and understands they can discontinue the visit at any time  Patient is aware this is a billable service  Subjective  Aye Davey is a 49 y o  male who presents today for follow-up  Overall doing well  Taking pantoprazole 40 mg daily for his Vickers's esophagus  Really has no reflux symptoms  Denies nausea vomiting, dysphagia, weight loss, early satiety  Denies any issues with regurgitation  Bowels are normal   Denies abdominal pain  Weight is stable  Denies NSAIDs  ASSESSMENT AND PLAN:      1  Vickers's esophagus without dysplasia  Biopsy showed no dysplasia  Recall EGD 7/2023     - pantoprazole (PROTONIX) 40 mg tablet; Take 1 tablet (40 mg total) by mouth daily  Dispense: 30 tablet; Refill: 11    2  History of colon polyps  Last colonoscopy in May 2017 with 1 adenoma, recall me 2022         Followup Appointment:  2 years  ______________________________________________________________________      Historical Information   Past Medical History:   Diagnosis Date    Vickers esophagus     Dyschromia     pt denies    GERD (gastroesophageal reflux disease)     Idiopathic peripheral neuropathy     Spinal stenosis      Past Surgical History:   Procedure Laterality Date    COLONOSCOPY      NO PAST SURGERIES       Social History     Substance and Sexual Activity   Alcohol Use No     Social History     Substance and Sexual Activity   Drug Use No     Social History     Tobacco Use   Smoking Status Current Every Day Smoker    Packs/day: 1 00    Types: Cigarettes   Smokeless Tobacco Never Used   Tobacco Comment    Smokes 1/2 pack of cigarettes per day     Family History   Problem Relation Age of Onset    Diabetes Father        Meds/Allergies       Current Outpatient Medications:     pantoprazole (PROTONIX) 40 mg tablet    No Known Allergies    PHYSICAL EXAM:    Height 6' 2" (1 88 m), weight 86 2 kg (190 lb)  Body mass index is 24 39 kg/m²  Appearance and vitals taken from home devices    General Appearance:   Alert, cooperative, no distress   HEENT:  Normocephalic, atraumatic, anicteric  Neck supple, symmetrical, trachea midline  Lungs:   Equal chest rise and unlabored breathing, normal effort, no coughing  Cardiovascular:   No visualized JVD or lower extremity edema  Abdomen:   No abdominal distension  Skin:   No jaundice, rashes, or lesions  Musculoskeletal:   Normal range of motion visualized  10 feet gait without difficulty and without assistive device  Psych:  Normal affect and normal insight  Neuro:  Alert and appropriate  Ox3         Lab Results:   Lab Results   Component Value Date    WBC 7 7 01/25/2019    HGB 13 1 01/25/2019    HCT 39 9 01/25/2019    MCV 94 01/25/2019     01/25/2019     Lab Results   Component Value Date     11/28/2016    K 4 4 10/18/2018     10/18/2018    CO2 28 10/18/2018    BUN 9 10/18/2018    CREATININE 0 71 (L) 10/18/2018    GLUCOSE 94 11/28/2016    CALCIUM 9 1 06/07/2018    AST 18 10/18/2018    ALT 16 10/18/2018    ALKPHOS 115 11/28/2016    PROT 6 4 11/28/2016    BILITOT <0 2 11/28/2016    EGFR 88 06/07/2018     No results found for: IRON, TIBC, FERRITIN  No results found for: LIPASE    Radiology Results:   No results found  REVIEW OF SYSTEMS:    CONSTITUTIONAL: Denies any fever, chills, rigors, and weight loss  HEENT: No earache or tinnitus  Denies hearing loss or visual disturbances  CARDIOVASCULAR: No chest pain or palpitations  RESPIRATORY: Denies any cough, hemoptysis, shortness of breath or dyspnea on exertion  GASTROINTESTINAL: As noted in the History of Present Illness  GENITOURINARY: No problems with urination  Denies any hematuria or dysuria  NEUROLOGIC: No dizziness or vertigo, denies headaches  MUSCULOSKELETAL: Denies any muscle or joint pain  SKIN: Denies skin rashes or itching     ENDOCRINE: Denies excessive thirst  Denies intolerance to heat or cold  PSYCHOSOCIAL: Denies depression or anxiety  Denies any recent memory loss  I spent 15 minutes directly with the patient during this visit      VIRTUAL VISIT DISCLAIMER    Jared Bueno acknowledges that he has consented to an online visit or consultation  He understands that the online visit is based solely on information provided by him, and that, in the absence of a face-to-face physical evaluation by the physician, the diagnosis he receives is both limited and provisional in terms of accuracy and completeness  This is not intended to replace a full medical face-to-face evaluation by the physician  Jared Bueno understands and accepts these terms

## 2020-08-27 NOTE — LETTER
August 27, 2020     Rosa Muller MD  [facility]    Patient: Omid Sinha   YOB: 1965   Date of Visit: 8/27/2020       Dear Dr Joaquin Puente: Thank you for referring Omid Sinha to me for evaluation  Below are my notes for this consultation  If you have questions, please do not hesitate to call me  I look forward to following your patient along with you  Sincerely,        Kimberly Swartz MD        CC: No Recipients  Kimberly Swartz MD  8/27/2020  1:40 PM  Incomplete    Virtual Regular Visit      Assessment/Plan:    Problem List Items Addressed This Visit        Digestive    Vickers esophagus - Primary    Relevant Medications    pantoprazole (PROTONIX) 40 mg tablet       Other    History of colon polyps               Reason for visit is   Chief Complaint   Patient presents with    Follow up to EGD    Virtual Regular Visit        Encounter provider Kimberly Swartz MD    Provider located at [address] [address] [phone]      Recent Visits  No visits were found meeting these conditions  Showing recent visits within past 7 days and meeting all other requirements     Today's Visits  Date Type Provider Dept   08/27/20 Telemedicine Kimberly Swartz MD [facility]   Showing today's visits and meeting all other requirements     Future Appointments  No visits were found meeting these conditions  Showing future appointments within next 150 days and meeting all other requirements        The patient was identified by name and date of birth  Omid Sinha was informed that this is a telemedicine visit and that the visit is being conducted through [employer] and patient was informed that this is not a secure, HIPAA-complaint platform  He agrees to proceed     My office door was closed  No one else was in the room    He acknowledged consent and understanding of privacy and security of the video platform  The patient has agreed to participate and understands they can discontinue the visit at any time  Patient is aware this is a billable service  Subjective  Rahat Briceno is a 49 y o  male who presents today for follow-up  Overall doing well  Taking pantoprazole 40 mg daily for his Vickers's esophagus  Really has no reflux symptoms  Denies nausea vomiting, dysphagia, weight loss, early satiety  Denies any issues with regurgitation  Bowels are normal   Denies abdominal pain  Weight is stable  Denies NSAIDs  ASSESSMENT AND PLAN:      1  Vickers's esophagus without dysplasia  Biopsy showed no dysplasia  Recall EGD 7/2023     - pantoprazole (PROTONIX) 40 mg tablet; Take 1 tablet (40 mg total) by mouth daily  Dispense: 30 tablet; Refill: 11    2  History of colon polyps  Last colonoscopy in May 2017 with 1 adenoma, recall me 2022         Followup Appointment:  2 years  ______________________________________________________________________      Historical Information   Past Medical History:   Diagnosis Date    Vickers esophagus     Dyschromia     pt denies    GERD (gastroesophageal reflux disease)     Idiopathic peripheral neuropathy     Spinal stenosis      Past Surgical History:   Procedure Laterality Date    COLONOSCOPY      NO PAST SURGERIES       Social History     Substance and Sexual Activity   Alcohol Use No     Social History     Substance and Sexual Activity   Drug Use No     Social History     Tobacco Use   Smoking Status Current Every Day Smoker    Packs/day: 1 00    Types: Cigarettes   Smokeless Tobacco Never Used   Tobacco Comment    Smokes 1/2 pack of cigarettes per day     Family History   Problem Relation Age of Onset    Diabetes Father        Meds/Allergies       Current Outpatient Medications:     pantoprazole (PROTONIX) 40 mg tablet    No Known Allergies    PHYSICAL EXAM:    Height 6' 2" (1 88 m), weight 86 2 kg (190 lb)  Body mass index is 24 39 kg/m²  Appearance and vitals taken from home devices    General Appearance:   Alert, cooperative, no distress   HEENT:  Normocephalic, atraumatic, anicteric  Neck supple, symmetrical, trachea midline  Lungs:   Equal chest rise and unlabored breathing, normal effort, no coughing  Cardiovascular:   No visualized JVD or lower extremity edema  Abdomen:   No abdominal distension  Skin:   No jaundice, rashes, or lesions  Musculoskeletal:   Normal range of motion visualized  10 feet gait without difficulty and without assistive device  Psych:  Normal affect and normal insight  Neuro:  Alert and appropriate  Ox3         Lab Results:   Lab Results   Component Value Date    WBC 7 7 01/25/2019    HGB 13 1 01/25/2019    HCT 39 9 01/25/2019    MCV 94 01/25/2019     01/25/2019     Lab Results   Component Value Date     11/28/2016    K 4 4 10/18/2018     10/18/2018    CO2 28 10/18/2018    BUN 9 10/18/2018    CREATININE 0 71 (L) 10/18/2018    GLUCOSE 94 11/28/2016    CALCIUM 9 1 06/07/2018    AST 18 10/18/2018    ALT 16 10/18/2018    ALKPHOS 115 11/28/2016    PROT 6 4 11/28/2016    BILITOT <0 2 11/28/2016    EGFR 88 06/07/2018     No results found for: IRON, TIBC, FERRITIN  No results found for: LIPASE    Radiology Results:   No results found  REVIEW OF SYSTEMS:    CONSTITUTIONAL: Denies any fever, chills, rigors, and weight loss  HEENT: No earache or tinnitus  Denies hearing loss or visual disturbances  CARDIOVASCULAR: No chest pain or palpitations  RESPIRATORY: Denies any cough, hemoptysis, shortness of breath or dyspnea on exertion  GASTROINTESTINAL: As noted in the History of Present Illness  GENITOURINARY: No problems with urination  Denies any hematuria or dysuria  NEUROLOGIC: No dizziness or vertigo, denies headaches  MUSCULOSKELETAL: Denies any muscle or joint pain  SKIN: Denies skin rashes or itching     ENDOCRINE: Denies excessive thirst  Denies intolerance to heat or cold  PSYCHOSOCIAL: Denies depression or anxiety  Denies any recent memory loss  I spent 15 minutes directly with the patient during this visit      VIRTUAL VISIT DISCLAIMER    Maude Myles acknowledges that he has consented to an online visit or consultation  He understands that the online visit is based solely on information provided by him, and that, in the absence of a face-to-face physical evaluation by the physician, the diagnosis he receives is both limited and provisional in terms of accuracy and completeness  This is not intended to replace a full medical face-to-face evaluation by the physician  Kaitlynntamanna Myles understands and accepts these terms  Warden Porfirio MD  8/27/2020  1:38 PM  Sign when Signing Visit    Virtual Regular Visit      Assessment/Plan:    Problem List Items Addressed This Visit        Digestive    Vickers esophagus - Primary    Relevant Medications    pantoprazole (PROTONIX) 40 mg tablet       Other    History of colon polyps               Reason for visit is   Chief Complaint   Patient presents with    Follow up to EGD    Virtual Regular Visit        Encounter provider Warden Porfirio MD    Provider located at [address] [address] [phone]      Recent Visits  No visits were found meeting these conditions  Showing recent visits within past 7 days and meeting all other requirements     Today's Visits  Date Type Provider Dept   08/27/20 Telemedicine Warden Porfirio MD [facility]   Showing today's visits and meeting all other requirements     Future Appointments  No visits were found meeting these conditions  Showing future appointments within next 150 days and meeting all other requirements        The patient was identified by name and date of birth   Maude Myles was informed that this is a telemedicine visit and that the visit is being conducted through {[facility]17[facility]}  {Telemedicine confidentiality :65070} {Telemedicine participants:35625}  He acknowledged consent and understanding of privacy and security of the video platform  The patient has agreed to participate and understands they can discontinue the visit at any time  Patient is aware this is a billable service  Subjective  Aye Davey is a 49 y o  male ***   HPI     Past Medical History:   Diagnosis Date    Vickers esophagus     Dyschromia     pt denies    GERD (gastroesophageal reflux disease)     Idiopathic peripheral neuropathy     Spinal stenosis        Past Surgical History:   Procedure Laterality Date    COLONOSCOPY      NO PAST SURGERIES         Current Outpatient Medications   Medication Sig Dispense Refill    pantoprazole (PROTONIX) 40 mg tablet Take 1 tablet (40 mg total) by mouth daily 30 tablet 11     No current facility-administered medications for this visit  No Known Allergies    Review of Systems    Video Exam    Vitals:    08/27/20 1254   Weight: 86 2 kg (190 lb)   Height: 6' 2" (1 88 m)       Physical Exam     {covid time spent:94102}      VIRTUAL VISIT DISCLAIMER    Aye Davey acknowledges that he has consented to an online visit or consultation  He understands that the online visit is based solely on information provided by him, and that, in the absence of a face-to-face physical evaluation by the physician, the diagnosis he receives is both limited and provisional in terms of accuracy and completeness  This is not intended to replace a full medical face-to-face evaluation by the physician  Aye Davey understands and accepts these terms

## 2020-08-27 NOTE — PROGRESS NOTES
Virtual Regular Visit      Assessment/Plan:    Problem List Items Addressed This Visit        Digestive    Vickers esophagus - Primary    Relevant Medications    pantoprazole (PROTONIX) 40 mg tablet       Other    History of colon polyps               Reason for visit is   Chief Complaint   Patient presents with    Follow up to EGD    Virtual Regular Visit        Encounter provider Alejandro Richey MD    Provider located at [address] [address] [phone]      Recent Visits  No visits were found meeting these conditions  Showing recent visits within past 7 days and meeting all other requirements     Today's Visits  Date Type Provider Dept   08/27/20 Telemedicine Alejandro Richey MD [facility]   Showing today's visits and meeting all other requirements     Future Appointments  No visits were found meeting these conditions  Showing future appointments within next 150 days and meeting all other requirements        The patient was identified by name and date of birth  Nils Moritz was informed that this is a telemedicine visit and that the visit is being conducted through [employer] and patient was informed that this is not a secure, HIPAA-complaint platform  He agrees to proceed     My office door was closed  No one else was in the room  He acknowledged consent and understanding of privacy and security of the video platform  The patient has agreed to participate and understands they can discontinue the visit at any time  Patient is aware this is a billable service  Subjective  Nils Moritz is a 49 y o  male who presents today for follow-up  Overall doing well  Taking pantoprazole 40 mg daily for his Vickers's esophagus  Really has no reflux symptoms  Denies nausea vomiting, dysphagia, weight loss, early satiety  Denies any issues with regurgitation    Bowels are normal   Denies abdominal pain   Weight is stable  Denies NSAIDs  ASSESSMENT AND PLAN:      1  Vickers's esophagus without dysplasia  Biopsy showed no dysplasia  Recall EGD 7/2023     - pantoprazole (PROTONIX) 40 mg tablet; Take 1 tablet (40 mg total) by mouth daily  Dispense: 30 tablet; Refill: 11    2  History of colon polyps  Last colonoscopy in May 2017 with 1 adenoma, recall me 2022  Followup Appointment:  2 years  ______________________________________________________________________      Historical Information   Past Medical History:   Diagnosis Date    Vickers esophagus     Dyschromia     pt denies    GERD (gastroesophageal reflux disease)     Idiopathic peripheral neuropathy     Spinal stenosis      Past Surgical History:   Procedure Laterality Date    COLONOSCOPY      NO PAST SURGERIES       Social History     Substance and Sexual Activity   Alcohol Use No     Social History     Substance and Sexual Activity   Drug Use No     Social History     Tobacco Use   Smoking Status Current Every Day Smoker    Packs/day: 1 00    Types: Cigarettes   Smokeless Tobacco Never Used   Tobacco Comment    Smokes 1/2 pack of cigarettes per day     Family History   Problem Relation Age of Onset    Diabetes Father        Meds/Allergies       Current Outpatient Medications:     pantoprazole (PROTONIX) 40 mg tablet    No Known Allergies    PHYSICAL EXAM:    Height 6' 2" (1 88 m), weight 86 2 kg (190 lb)  Body mass index is 24 39 kg/m²  Appearance and vitals taken from home devices    General Appearance:   Alert, cooperative, no distress   HEENT:  Normocephalic, atraumatic, anicteric  Neck supple, symmetrical, trachea midline  Lungs:   Equal chest rise and unlabored breathing, normal effort, no coughing  Cardiovascular:   No visualized JVD or lower extremity edema  Abdomen:   No abdominal distension  Skin:   No jaundice, rashes, or lesions  Musculoskeletal:   Normal range of motion visualized   10 feet gait without difficulty and without assistive device  Psych:  Normal affect and normal insight  Neuro:  Alert and appropriate  Ox3         Lab Results:   Lab Results   Component Value Date    WBC 7 7 01/25/2019    HGB 13 1 01/25/2019    HCT 39 9 01/25/2019    MCV 94 01/25/2019     01/25/2019     Lab Results   Component Value Date     11/28/2016    K 4 4 10/18/2018     10/18/2018    CO2 28 10/18/2018    BUN 9 10/18/2018    CREATININE 0 71 (L) 10/18/2018    GLUCOSE 94 11/28/2016    CALCIUM 9 1 06/07/2018    AST 18 10/18/2018    ALT 16 10/18/2018    ALKPHOS 115 11/28/2016    PROT 6 4 11/28/2016    BILITOT <0 2 11/28/2016    EGFR 88 06/07/2018     No results found for: IRON, TIBC, FERRITIN  No results found for: LIPASE    Radiology Results:   No results found  REVIEW OF SYSTEMS:    CONSTITUTIONAL: Denies any fever, chills, rigors, and weight loss  HEENT: No earache or tinnitus  Denies hearing loss or visual disturbances  CARDIOVASCULAR: No chest pain or palpitations  RESPIRATORY: Denies any cough, hemoptysis, shortness of breath or dyspnea on exertion  GASTROINTESTINAL: As noted in the History of Present Illness  GENITOURINARY: No problems with urination  Denies any hematuria or dysuria  NEUROLOGIC: No dizziness or vertigo, denies headaches  MUSCULOSKELETAL: Denies any muscle or joint pain  SKIN: Denies skin rashes or itching  ENDOCRINE: Denies excessive thirst  Denies intolerance to heat or cold  PSYCHOSOCIAL: Denies depression or anxiety  Denies any recent memory loss  I spent 15 minutes directly with the patient during this visit      VIRTUAL VISIT DISCLAIMER    Sarah Lee acknowledges that he has consented to an online visit or consultation   He understands that the online visit is based solely on information provided by him, and that, in the absence of a face-to-face physical evaluation by the physician, the diagnosis he receives is both limited and provisional in terms of accuracy and completeness  This is not intended to replace a full medical face-to-face evaluation by the physician  Nils Moritz understands and accepts these terms

## 2022-05-23 NOTE — PROGRESS NOTES
Assessment    1  Encounter for preventive health examination (V70 0) (Z00 00)   2  Attention-deficit/hyperactivity disorder (314 01) (F90 9)    Plan  Attention-deficit/hyperactivity disorder    · Amphetamine-Dextroamphet ER 30 MG Oral Capsule Extended Release 24  Hour; TAKE 2 CAPSULES DAILY; Do Not Fill Before: 05CGN0388  Gastritis    · Omeprazole 40 MG Oral Capsule Delayed Release; TAKE 1 CAPSULE Daily    Discussion/Summary  Advice and education were given regarding aerobic exercise  Chief Complaint  ROUTINE PE      Advance Directives  Advance Directive [facility]:   NO - Patient does not have an advance health care directive  History of Present Illness  HM, Adult Male: The patient is being seen for a health maintenance evaluation  Social History: Household members include spouse and 1 daughter(s)  He is   Work status: working full time  The patient is a current cigarette smoker and has never used smokeless tobacco  He is ready to quit using tobacco  He reports rare alcohol use and denies binge drinking  The patient has no concerns about alcohol abuse  He has never used illicit drugs  General Health: The patient's health since the last visit is described as good  He has regular dental visits  He denies vision problems  He denies hearing loss  Immunizations status: up to date  Lifestyle:  He consumes a diverse and healthy diet  He does not have any weight concerns  He does not exercise regularly  He uses tobacco  He consumes alcohol  He denies drug use  Reproductive health:  the patient is sexually active  Screening: Active Problems    1  Allergic contact dermatitis (692 9) (L23 9)   2  Attention-deficit/hyperactivity disorder (314 01) (F90 9)   3  Bakers cyst (727 51) (M71 20)   4  Chronic lumbar pain (724 2,338 29) (M54 5,G89 29)   5  Encounter for prostate cancer screening (V76 44) (Z12 5)   6  Encounter for screening colonoscopy (V76 51) (Z12 11)   7   Family history of high cholesterol (V18 19) (Z83 42)   8  Fatigue (780 79) (R53 83)   9  Gastritis (535 50) (K29 70)   10  Lateral epicondylitis of left elbow (726 32) (M77 12)   11  Localized enlarged lymph nodes (785 6) (R59 0)   12  Pre-ulcerative corn or callous (700) (L84)   13  Shoulder bursitis, left (726 10) (M75 52)    Past Medical History    · History of dyschromia (V13 3) (Z87 2)   · History of spinal stenosis (V13 59) (Z87 39)   · History of Idiopathic peripheral neuropathy (356 9) (G60 9)   · History of Myalgia and myositis (729 1)    Family History  Mother    · Denied: Family history of substance abuse   · Denied: FH: mental illness  Father    · Family history of diabetes mellitus (V18 0) (Z83 3)   · Denied: Family history of substance abuse   · Denied: FH: mental illness    Social History    · Current every day smoker (305 1) (F17 200)   · Smokes < 1 pack of cigarettes per day (305 1) (F17 210)    Current Meds   1  Amphetamine-Dextroamphet ER 30 MG Oral Capsule Extended Release 24 Hour; TAKE   2 CAPSULES DAILY; Therapy: 26CAR2045 to (Evaluate:25Nov2017); Last Rx:26Oct2017; Status: ACTIVE -   Retrospective By Protocol Authorization Ordered   2  Duexis 800-26 6 MG Oral Tablet; take one table tid; Therapy: 37JYB4370 to (Evaluate:29Mar2017)  Requested for: 04OQB2142; Last   Rx:29Nov2016; Status: ACTIVE - Retrospective By Protocol Authorization Ordered   3  OxyCODONE HCl - 15 MG Oral Tablet; 1 q 8hrs  prn; Therapy: 06SJX8827 to (Last Rx:05Jan2017); Status: ACTIVE - Retrospective By   Protocol Authorization Ordered   4  PredniSONE 20 MG Oral Tablet; 1 bid x 5 days , 1 qd x 5 days; Therapy: 43WLO0579 to (Evaluate:08Jan2017)  Requested for: 17VTT1023; Last   Rx:29Nov2016 Ordered   5  Triamcinolone Acetonide 0 5 % External Cream; APPLY 2-3 TIMES DAILY TO   AFFECTED AREA(S); Therapy: 98LSQ2517 to (Last Rx:27Jun2017)  Requested for: 68OVX0562 Ordered    Allergies    1   No Known Drug Allergies    Vitals   Recorded: 90EHO4741 09: 16AM   Heart Rate 90   Systolic 473   Diastolic 76   Height 6 ft 2 in   Weight 180 lb    BMI Calculated 23 11   BSA Calculated 2 08   O2 Saturation 98     Physical Exam    Constitutional   General appearance: No acute distress, well appearing and well nourished  Neck   Neck: Supple, symmetric, trachea midline, no masses  Thyroid: Normal, no thyromegaly  Pulmonary   Auscultation of lungs: Clear to auscultation  Cardiovascular   Auscultation of heart: Normal rate and rhythm, normal S1 and S2, no murmurs  Pedal pulses: 2+ bilaterally  Abdomen   Abdomen: Non-tender, no masses  Liver and spleen: No hepatomegaly or splenomegaly  Results/Data  PHQ-2 Adult Depression Screening [id number] 09:19AM User, Ahs     Test Name Result Flag Reference   PHQ-2 Adult Depression Score 0     Over the last two weeks, how often have you been bothered by any of the following problems? Little interest or pleasure in doing things: Not at all - 0  Feeling down, depressed, or hopeless: Not at all - 0   PHQ-2 Adult Depression Screening Negative         Health Management  Encounter for screening colonoscopy   COLONOSCOPY; every 10 years; Last 24HIV1620; Next Due: 14QXN2263;  Active    Signatures   Electronically signed by : Arnulfo Gallardo MD; Dec  8 2017  9:47AM EST                       (Author) Detail Level: Generalized Include Location In Plan?: No

## 2022-10-04 NOTE — TELEPHONE ENCOUNTER
Referral #: [id number]  Effective: 02/26/2018  Expires: 05/26/2018    Referral [id number] has been successfully submitted 
17

## 2024-11-13 NOTE — TELEPHONE ENCOUNTER
[facility] labs located on [facility] that were done on 12/07/2018 by Dr Angelica Nelson  Results were for B12/Folate,  Fe/TIBC, Ferritin, Fecal Occult Blood    Results were all NORMAL except Iron Sat low at 13 (15-55)      Reviewed hard copy of labs with Dr Elisa Samano additional labs are needed, just to get the CBC as ordered  11/21/18  Hard copy to be scan box  Patient notified per Dr Jorden Escoto, to get the CBC done, patient agrees to do  [id number]